# Patient Record
Sex: FEMALE | NOT HISPANIC OR LATINO | Employment: UNEMPLOYED | URBAN - METROPOLITAN AREA
[De-identification: names, ages, dates, MRNs, and addresses within clinical notes are randomized per-mention and may not be internally consistent; named-entity substitution may affect disease eponyms.]

---

## 2019-09-24 ENCOUNTER — OFFICE VISIT (OUTPATIENT)
Dept: FAMILY MEDICINE CLINIC | Facility: CLINIC | Age: 59
End: 2019-09-24

## 2019-09-24 VITALS
TEMPERATURE: 98.1 F | HEIGHT: 62 IN | SYSTOLIC BLOOD PRESSURE: 136 MMHG | DIASTOLIC BLOOD PRESSURE: 84 MMHG | HEART RATE: 75 BPM | WEIGHT: 215.38 LBS | BODY MASS INDEX: 39.63 KG/M2 | OXYGEN SATURATION: 97 %

## 2019-09-24 DIAGNOSIS — I10 ESSENTIAL HYPERTENSION: Primary | ICD-10-CM

## 2019-09-24 DIAGNOSIS — E78.5 HYPERLIPIDEMIA, UNSPECIFIED HYPERLIPIDEMIA TYPE: ICD-10-CM

## 2019-09-24 DIAGNOSIS — R07.9 CHEST PAIN, UNSPECIFIED TYPE: ICD-10-CM

## 2019-09-24 DIAGNOSIS — E11.9 TYPE 2 DIABETES MELLITUS WITHOUT COMPLICATION, WITHOUT LONG-TERM CURRENT USE OF INSULIN (HCC): ICD-10-CM

## 2019-09-24 DIAGNOSIS — Z78.0 POSTMENOPAUSE: ICD-10-CM

## 2019-09-24 DIAGNOSIS — Z00.00 HEALTH CARE MAINTENANCE: ICD-10-CM

## 2019-09-24 DIAGNOSIS — M19.90 ARTHRITIS: ICD-10-CM

## 2019-09-24 DIAGNOSIS — K21.9 GASTROESOPHAGEAL REFLUX DISEASE WITHOUT ESOPHAGITIS: ICD-10-CM

## 2019-09-24 DIAGNOSIS — R21 RASH: ICD-10-CM

## 2019-09-24 PROCEDURE — 99203 OFFICE O/P NEW LOW 30 MIN: CPT | Performed by: FAMILY MEDICINE

## 2019-09-24 RX ORDER — LANCETS 28 GAUGE
1 EACH MISCELLANEOUS
COMMUNITY
End: 2019-09-24 | Stop reason: SDUPTHER

## 2019-09-24 RX ORDER — ATORVASTATIN CALCIUM 40 MG/1
40 TABLET, FILM COATED ORAL DAILY
Qty: 90 TABLET | Refills: 3 | Status: SHIPPED | OUTPATIENT
Start: 2019-09-24 | End: 2019-12-20 | Stop reason: SDUPTHER

## 2019-09-24 RX ORDER — GLIMEPIRIDE 4 MG/1
4 TABLET ORAL DAILY
COMMUNITY
End: 2019-09-24 | Stop reason: SDUPTHER

## 2019-09-24 RX ORDER — RANITIDINE 150 MG/1
150 TABLET ORAL 2 TIMES DAILY
Qty: 180 TABLET | Refills: 3 | Status: SHIPPED | OUTPATIENT
Start: 2019-09-24 | End: 2020-03-17

## 2019-09-24 RX ORDER — CLOBETASOL PROPIONATE 0.5 MG/G
OINTMENT TOPICAL 2 TIMES DAILY
COMMUNITY
End: 2019-09-24 | Stop reason: SDUPTHER

## 2019-09-24 RX ORDER — ASPIRIN 81 MG/1
TABLET, COATED ORAL
COMMUNITY
Start: 2019-07-10 | End: 2020-09-24 | Stop reason: SDUPTHER

## 2019-09-24 RX ORDER — BUTALBITAL, ACETAMINOPHEN AND CAFFEINE 50; 325; 40 MG/1; MG/1; MG/1
1 CAPSULE ORAL
COMMUNITY
End: 2019-09-24

## 2019-09-24 RX ORDER — ATORVASTATIN CALCIUM 40 MG/1
40 TABLET, FILM COATED ORAL DAILY
COMMUNITY
End: 2019-09-24 | Stop reason: SDUPTHER

## 2019-09-24 RX ORDER — BENAZEPRIL HYDROCHLORIDE 40 MG/1
TABLET, FILM COATED ORAL
COMMUNITY
Start: 2019-07-10 | End: 2019-09-24 | Stop reason: SDUPTHER

## 2019-09-24 RX ORDER — OMEPRAZOLE 40 MG/1
CAPSULE, DELAYED RELEASE ORAL
COMMUNITY
Start: 2019-07-10 | End: 2019-09-24 | Stop reason: SDUPTHER

## 2019-09-24 RX ORDER — BENAZEPRIL HYDROCHLORIDE 40 MG/1
40 TABLET, FILM COATED ORAL DAILY
Qty: 90 TABLET | Refills: 3 | Status: SHIPPED | OUTPATIENT
Start: 2019-09-24 | End: 2020-11-02 | Stop reason: SDUPTHER

## 2019-09-24 RX ORDER — OMEPRAZOLE 40 MG/1
40 CAPSULE, DELAYED RELEASE ORAL DAILY
Qty: 90 CAPSULE | Refills: 3 | Status: SHIPPED | OUTPATIENT
Start: 2019-09-24 | End: 2020-03-17 | Stop reason: SDUPTHER

## 2019-09-24 RX ORDER — LANCETS 28 GAUGE
1 EACH MISCELLANEOUS 2 TIMES DAILY
Qty: 100 EACH | Refills: 6 | Status: SHIPPED | OUTPATIENT
Start: 2019-09-24 | End: 2020-11-02 | Stop reason: SDUPTHER

## 2019-09-24 RX ORDER — CLOBETASOL PROPIONATE 0.5 MG/G
OINTMENT TOPICAL 2 TIMES DAILY
Qty: 3 EACH | Refills: 6 | Status: SHIPPED | OUTPATIENT
Start: 2019-09-24 | End: 2019-12-20 | Stop reason: SDUPTHER

## 2019-09-24 RX ORDER — GLIMEPIRIDE 4 MG/1
4 TABLET ORAL DAILY
Qty: 90 TABLET | Refills: 3 | Status: SHIPPED | OUTPATIENT
Start: 2019-09-24 | End: 2019-12-20 | Stop reason: SDUPTHER

## 2019-09-24 RX ORDER — RANITIDINE 150 MG/1
300 TABLET ORAL 2 TIMES DAILY
COMMUNITY
End: 2019-09-24 | Stop reason: SDUPTHER

## 2019-09-24 RX ORDER — PREDNISOLONE ACETATE 10 MG/ML
1 SUSPENSION/ DROPS OPHTHALMIC 4 TIMES DAILY
COMMUNITY
End: 2021-02-02

## 2019-09-24 NOTE — PROGRESS NOTES
Subjective   Queenie Lubin is a 59 y.o. female.     Chief Complaint   Patient presents with   • Hypertension   • Diabetes   • Pain     knee & back   • Eye Problem     left eye   • Hyperlipidemia   • Heartburn     Queenie is a  new patient today.  She is 59-year-old with multiple chronic medical conditions.  But those medical conditions are new for me today.        Hypertension   This is a chronic problem. The current episode started more than 1 year ago. The problem is unchanged. The problem is controlled. Associated symptoms include chest pain. Pertinent negatives include no anxiety. There are no associated agents to hypertension. Risk factors for coronary artery disease include diabetes mellitus and dyslipidemia. Past treatments include ACE inhibitors. Current antihypertension treatment includes ACE inhibitors. The current treatment provides significant improvement. There are no compliance problems.  There is no history of angina, kidney disease or CAD/MI.   Diabetes   She presents for her follow-up diabetic visit. She has type 2 diabetes mellitus. Her disease course has been stable. Associated symptoms include chest pain. There are no hypoglycemic complications. Symptoms are stable. She is following a diabetic diet. She never participates in exercise. An ACE inhibitor/angiotensin II receptor blocker is being taken. She does not see a podiatrist.Eye exam is current.   Hyperlipidemia   This is a chronic problem. The problem is controlled. Recent lipid tests were reviewed and are normal. Associated symptoms include chest pain and myalgias. Current antihyperlipidemic treatment includes statins.   Heartburn   She complains of chest pain. This is a chronic problem. The current episode started more than 1 year ago. The problem occurs frequently. The problem has been gradually worsening. The symptoms are aggravated by certain foods. She has tried a PPI for the symptoms. The treatment provided moderate (noncompliant)  relief.        Is here today for the first time she is a new patient, 59-year-old with multiple medical problems,  Problems are chronic however new to me today.    The following portions of the patient's history were reviewed and updated as appropriate: allergies, current medications, past family history, past medical history, past social history, past surgical history and problem list.    Past Medical History:   Diagnosis Date   • Diabetes mellitus (CMS/HCC)    • GERD (gastroesophageal reflux disease)    • Hyperlipidemia    • Hypertension        History reviewed. No pertinent surgical history.    Family History   Problem Relation Age of Onset   • Heart disease Mother        Social History     Socioeconomic History   • Marital status:      Spouse name: Not on file   • Number of children: Not on file   • Years of education: Not on file   • Highest education level: Not on file   Tobacco Use   • Smoking status: Never Smoker   • Smokeless tobacco: Never Used   Substance and Sexual Activity   • Alcohol use: Defer   • Drug use: Defer   • Sexual activity: Defer       Current Outpatient Medications on File Prior to Visit   Medication Sig Dispense Refill   • ASPIRIN LOW DOSE 81 MG EC tablet      • prednisoLONE acetate (PRED FORTE) 1 % ophthalmic suspension 1 drop 4 (Four) Times a Day. Instill one drop in the left eye three times a day for one week. Instil one drop in the left eye for one week  2 times a day for one week. Instill one drop in the left eye once daily  As maintenance.     • [DISCONTINUED] atorvastatin (LIPITOR) 40 MG tablet Take 40 mg by mouth Daily.     • [DISCONTINUED] benazepril (LOTENSIN) 40 MG tablet      • [DISCONTINUED] Butalbital-APAP-Caffeine -40 MG per capsule Take 1 capsule by mouth.     • [DISCONTINUED] clobetasol (TEMOVATE) 0.05 % ointment Apply  topically to the appropriate area as directed 2 (Two) Times a Day.     • [DISCONTINUED] diclofenac (VOLTAREN) 1 % gel gel Place  on the skin  "as directed by provider 4 (Four) Times a Day.     • [DISCONTINUED] EASY TOUCH SAFETY LANCETS 28G misc Inject 1 each under the skin into the appropriate area as directed.     • [DISCONTINUED] glimepiride (AMARYL) 4 MG tablet Take 4 mg by mouth Daily.     • [DISCONTINUED] metFORMIN (GLUCOPHAGE) 1000 MG tablet Take 1,000 mg by mouth 2 (Two) Times a Day With Meals.     • [DISCONTINUED] omeprazole (priLOSEC) 40 MG capsule      • [DISCONTINUED] raNITIdine (RANITIDINE 150 MAX STRENGTH) 150 MG tablet Take 300 mg by mouth 2 (Two) Times a Day.       No current facility-administered medications on file prior to visit.        Review of Systems   Cardiovascular: Positive for chest pain.   Gastrointestinal: Positive for GERD.   Musculoskeletal: Positive for arthralgias and myalgias.   All other systems reviewed and are negative.      No results found for this or any previous visit (from the past 4704 hour(s)).  Objective   Vitals:    09/24/19 0855   BP: 136/84   Pulse: 75   Temp: 98.1 °F (36.7 °C)   SpO2: 97%   Weight: 97.7 kg (215 lb 6 oz)   Height: 157.7 cm (62.1\")     Body mass index is 39.27 kg/m².  Physical Exam   Constitutional: She is oriented to person, place, and time. She appears well-developed and well-nourished.   obese   HENT:   Head: Normocephalic and atraumatic.   Right Ear: External ear normal.   Left Ear: External ear normal.   Nose: Nose normal.   Mouth/Throat: Oropharynx is clear and moist.   Eyes: Conjunctivae and EOM are normal. Pupils are equal, round, and reactive to light.   Neck: Normal range of motion. Neck supple. No tracheal deviation present. No thyromegaly present.   Cardiovascular: Normal rate, regular rhythm and normal heart sounds. Exam reveals no gallop and no friction rub.   No murmur heard.  Pulmonary/Chest: Effort normal and breath sounds normal. No respiratory distress. She exhibits no tenderness.   Abdominal: Soft. Bowel sounds are normal. She exhibits no distension. There is no tenderness. "   Musculoskeletal: Normal range of motion. She exhibits no edema or tenderness.   Lymphadenopathy:     She has no cervical adenopathy.   Neurological: She is alert and oriented to person, place, and time. She displays normal reflexes. No cranial nerve deficit or sensory deficit. Coordination normal.   Skin: Skin is warm and dry.   Psychiatric: She has a normal mood and affect. Her behavior is normal. Judgment and thought content normal.   Nursing note and vitals reviewed.        Assessment/Plan   Queenie was seen today for hypertension, diabetes, pain, eye problem, hyperlipidemia and heartburn.    Diagnoses and all orders for this visit:    Essential hypertension  -     benazepril (LOTENSIN) 40 MG tablet; Take 1 tablet by mouth Daily.    Hyperlipidemia, unspecified hyperlipidemia type  -     atorvastatin (LIPITOR) 40 MG tablet; Take 1 tablet by mouth Daily.    Type 2 diabetes mellitus without complication, without long-term current use of insulin (CMS/[facility])  -     metFORMIN (GLUCOPHAGE) 1000 MG tablet; Take 1 tablet by mouth 2 (Two) Times a Day With Meals.  -     glimepiride (AMARYL) 4 MG tablet; Take 1 tablet by mouth Daily.  -     EASY TOUCH SAFETY LANCETS 28G misc; Inject 1 each under the skin into the appropriate area as directed 2 (Two) Times a Day.  -     Hemoglobin A1c  -     Comprehensive Metabolic Panel  -     Lipid Panel With LDL / HDL Ratio  -     Microalbumin / Creatinine Urine Ratio - Urine, Clean Catch    Gastroesophageal reflux disease without esophagitis  -     raNITIdine (RANITIDINE 150 MAX STRENGTH) 150 MG tablet; Take 1 tablet by mouth 2 (Two) Times a Day.  -     omeprazole (priLOSEC) 40 MG capsule; Take 1 capsule by mouth Daily.    Chest pain, unspecified type    Health care maintenance  -     Mammo Screening Bilateral With CAD; Future  -     Cologuard - Stool, Per Rectum    Arthritis  -     diclofenac (VOLTAREN) 1 % gel gel; Apply  topically to the appropriate area as directed 4 (Four) Times a  Day.    Rash  -     clobetasol (TEMOVATE) 0.05 % ointment; Apply  topically to the appropriate area as directed 2 (Two) Times a Day.    Postmenopause  -     DEXA Bone Density Axial      Return in about 3 months (around 12/24/2019) for DIABETES.    Pt had complete work up for CP which was negative by cardiology within last year.

## 2019-09-25 LAB
ALBUMIN SERPL-MCNC: 4.7 G/DL (ref 3.5–5.2)
ALBUMIN/CREAT UR: 30.6 MG/G CREAT (ref 0–30)
ALBUMIN/GLOB SERPL: 1.5 G/DL
ALP SERPL-CCNC: 113 U/L (ref 39–117)
ALT SERPL-CCNC: 23 U/L (ref 1–33)
AST SERPL-CCNC: 17 U/L (ref 1–32)
BILIRUB SERPL-MCNC: 0.4 MG/DL (ref 0.2–1.2)
BUN SERPL-MCNC: 11 MG/DL (ref 6–20)
BUN/CREAT SERPL: 17.7 (ref 7–25)
CALCIUM SERPL-MCNC: 11 MG/DL (ref 8.6–10.5)
CHLORIDE SERPL-SCNC: 95 MMOL/L (ref 98–107)
CHOLEST SERPL-MCNC: 250 MG/DL (ref 0–200)
CO2 SERPL-SCNC: 29.3 MMOL/L (ref 22–29)
CREAT SERPL-MCNC: 0.62 MG/DL (ref 0.57–1)
CREAT UR-MCNC: 61.2 MG/DL
GLOBULIN SER CALC-MCNC: 3.1 GM/DL
GLUCOSE SERPL-MCNC: 366 MG/DL (ref 65–99)
HBA1C MFR BLD: 11.9 % (ref 4.8–5.6)
HDLC SERPL-MCNC: 42 MG/DL (ref 40–60)
LDLC SERPL CALC-MCNC: 160 MG/DL (ref 0–100)
LDLC/HDLC SERPL: 3.8 {RATIO}
MICROALBUMIN UR-MCNC: 18.7 UG/ML
POTASSIUM SERPL-SCNC: 4.5 MMOL/L (ref 3.5–5.2)
PROT SERPL-MCNC: 7.8 G/DL (ref 6–8.5)
SODIUM SERPL-SCNC: 136 MMOL/L (ref 136–145)
TRIGL SERPL-MCNC: 242 MG/DL (ref 0–150)
VLDLC SERPL CALC-MCNC: 48.4 MG/DL

## 2019-10-26 ENCOUNTER — RESULTS ENCOUNTER (OUTPATIENT)
Dept: FAMILY MEDICINE CLINIC | Facility: CLINIC | Age: 59
End: 2019-10-26

## 2019-10-26 DIAGNOSIS — Z00.00 HEALTH CARE MAINTENANCE: ICD-10-CM

## 2019-10-31 ENCOUNTER — APPOINTMENT (OUTPATIENT)
Dept: BONE DENSITY | Facility: HOSPITAL | Age: 59
End: 2019-10-31

## 2019-11-27 ENCOUNTER — TELEPHONE (OUTPATIENT)
Dept: FAMILY MEDICINE CLINIC | Facility: CLINIC | Age: 59
End: 2019-11-27

## 2019-11-27 NOTE — TELEPHONE ENCOUNTER
[facility] laboratory called to confirm pt contact info. They stated that the patient in the cologaurd testing however they did not put the date and time on collection. Please note [facility] will only be able to keep the specimen 60day from date received (11/7/19). They will continue to tray an reach patient at the following number [phone].

## 2019-12-20 ENCOUNTER — OFFICE VISIT (OUTPATIENT)
Dept: FAMILY MEDICINE CLINIC | Facility: CLINIC | Age: 59
End: 2019-12-20

## 2019-12-20 VITALS
OXYGEN SATURATION: 98 % | DIASTOLIC BLOOD PRESSURE: 78 MMHG | WEIGHT: 220.38 LBS | HEIGHT: 62 IN | TEMPERATURE: 97.7 F | BODY MASS INDEX: 40.55 KG/M2 | HEART RATE: 80 BPM | SYSTOLIC BLOOD PRESSURE: 128 MMHG

## 2019-12-20 DIAGNOSIS — L40.9 PSORIASIS: ICD-10-CM

## 2019-12-20 DIAGNOSIS — E83.52 HYPERCALCEMIA: ICD-10-CM

## 2019-12-20 DIAGNOSIS — E11.9 TYPE 2 DIABETES MELLITUS WITHOUT COMPLICATION, WITHOUT LONG-TERM CURRENT USE OF INSULIN (HCC): Primary | ICD-10-CM

## 2019-12-20 DIAGNOSIS — R21 RASH: ICD-10-CM

## 2019-12-20 DIAGNOSIS — E78.5 HYPERLIPIDEMIA, UNSPECIFIED HYPERLIPIDEMIA TYPE: ICD-10-CM

## 2019-12-20 DIAGNOSIS — E66.01 MORBIDLY OBESE (HCC): ICD-10-CM

## 2019-12-20 PROCEDURE — 99214 OFFICE O/P EST MOD 30 MIN: CPT | Performed by: FAMILY MEDICINE

## 2019-12-20 RX ORDER — CLOBETASOL PROPIONATE 0.5 MG/G
OINTMENT TOPICAL 2 TIMES DAILY
Qty: 240 G | Refills: 6 | Status: SHIPPED | OUTPATIENT
Start: 2019-12-20 | End: 2020-11-02

## 2019-12-20 RX ORDER — ACYCLOVIR 400 MG/1
TABLET ORAL
COMMUNITY
Start: 2019-09-24

## 2019-12-20 RX ORDER — BLOOD-GLUCOSE METER
1 EACH MISCELLANEOUS DAILY
Refills: 0 | COMMUNITY
Start: 2019-09-26 | End: 2020-09-24 | Stop reason: SDUPTHER

## 2019-12-20 RX ORDER — ATORVASTATIN CALCIUM 40 MG/1
80 TABLET, FILM COATED ORAL DAILY
Qty: 90 TABLET | Refills: 3 | Status: SHIPPED | OUTPATIENT
Start: 2019-12-20 | End: 2020-11-02 | Stop reason: SDUPTHER

## 2019-12-20 RX ORDER — GLIMEPIRIDE 4 MG/1
4 TABLET ORAL DAILY
Qty: 90 TABLET | Refills: 3 | Status: SHIPPED | OUTPATIENT
Start: 2019-12-20 | End: 2020-02-24 | Stop reason: SDUPTHER

## 2019-12-20 RX ORDER — BLOOD SUGAR DIAGNOSTIC
1 STRIP MISCELLANEOUS DAILY
Refills: 0 | COMMUNITY
Start: 2019-09-26 | End: 2020-05-08 | Stop reason: SDUPTHER

## 2019-12-20 NOTE — PROGRESS NOTES
Subjective   Queenie Lubin is a 59 y.o. female.     Chief Complaint   Patient presents with   • Diabetes   • Hyperlipidemia   • Hypertension   • Psoriasis       Diabetes   She presents for her follow-up diabetic visit. She has type 2 diabetes mellitus. No MedicAlert identification noted. Her disease course has been worsening. Pertinent negatives for hypoglycemia include no dizziness. There are no diabetic associated symptoms. Pertinent negatives for diabetes include no blurred vision, no chest pain, no fatigue, no polydipsia and no polyuria. Symptoms are worsening. Current diabetic treatment includes oral agent (dual therapy). She is compliant with treatment most of the time. Her weight is increasing steadily.   Hyperlipidemia   This is a chronic problem. The problem is uncontrolled. Pertinent negatives include no chest pain or shortness of breath.   Hypertension   This is a chronic problem. The current episode started more than 1 year ago. The problem has been waxing and waning since onset. The problem is controlled. Pertinent negatives include no blurred vision, chest pain, palpitations or shortness of breath.   Psoriasis   This is a chronic (UNCONTROLLED, SEEING DERM.) problem.          The following portions of the patient's history were reviewed and updated as appropriate: allergies, current medications, past family history, past medical history, past social history, past surgical history and problem list.    Past Medical History:   Diagnosis Date   • Diabetes mellitus (CMS/[facility])    • GERD (gastroesophageal reflux disease)    • Hyperlipidemia    • Hypertension        History reviewed. No pertinent surgical history.    Family History   Problem Relation Age of Onset   • Heart disease Mother        Social History     Socioeconomic History   • Marital status:      Spouse name: Not on file   • Number of children: Not on file   • Years of education: Not on file   • Highest education level: Not on file    Tobacco Use   • Smoking status: Never Smoker   • Smokeless tobacco: Never Used   Substance and Sexual Activity   • Alcohol use: Defer   • Drug use: Defer   • Sexual activity: Defer       Current Outpatient Medications on File Prior to Visit   Medication Sig Dispense Refill   • ACCU-CHEK MATEO PLUS test strip 1 each by Other route Daily.  0   • acyclovir (ZOVIRAX) 400 MG tablet      • ASPIRIN LOW DOSE 81 MG EC tablet      • benazepril (LOTENSIN) 40 MG tablet Take 1 tablet by mouth Daily. 90 tablet 3   • diclofenac (VOLTAREN) 1 % gel gel Apply  topically to the appropriate area as directed 4 (Four) Times a Day. 3 tube 6   • EASY TOUCH SAFETY LANCETS 28G misc Inject 1 each under the skin into the appropriate area as directed 2 (Two) Times a Day. 100 each 6   • metFORMIN (GLUCOPHAGE) 1000 MG tablet Take 1 tablet by mouth 2 (Two) Times a Day With Meals. 180 tablet 3   • omeprazole (priLOSEC) 40 MG capsule Take 1 capsule by mouth Daily. 90 capsule 3   • prednisoLONE acetate (PRED FORTE) 1 % ophthalmic suspension 1 drop 4 (Four) Times a Day. Instill one drop in the left eye three times a day for one week. Instil one drop in the left eye for one week  2 times a day for one week. Instill one drop in the left eye once daily  As maintenance.     • raNITIdine (RANITIDINE 150 MAX STRENGTH) 150 MG tablet Take 1 tablet by mouth 2 (Two) Times a Day. 180 tablet 3   • [DISCONTINUED] atorvastatin (LIPITOR) 40 MG tablet Take 1 tablet by mouth Daily. 90 tablet 3   • [DISCONTINUED] clobetasol (TEMOVATE) 0.05 % ointment Apply  topically to the appropriate area as directed 2 (Two) Times a Day. 3 each 6   • [DISCONTINUED] glimepiride (AMARYL) 4 MG tablet Take 1 tablet by mouth Daily. 90 tablet 3   • Blood Glucose Monitoring Suppl (ACCU-CHEK MATEO PLUS) w/Device kit 1 each by Other route Daily.  0     No current facility-administered medications on file prior to visit.        Review of Systems   Constitutional: Negative for fatigue and  unexpected weight gain.   Eyes: Negative for blurred vision.   Respiratory: Negative for cough, chest tightness and shortness of breath.    Cardiovascular: Negative for chest pain, palpitations and leg swelling.   Gastrointestinal: Negative for nausea and vomiting.   Endocrine: Negative for polydipsia and polyuria.   Genitourinary: Negative for frequency.   Skin: Negative for skin lesions.   Neurological: Negative for dizziness, light-headedness, numbness and headache.       Recent Results (from the past 4704 hour(s))   Hemoglobin A1c    Collection Time: 09/24/19  9:49 AM   Result Value Ref Range    Hemoglobin A1C 11.90 (H) 4.80 - 5.60 %   Comprehensive Metabolic Panel    Collection Time: 09/24/19  9:49 AM   Result Value Ref Range    Glucose 366 (H) 65 - 99 mg/dL    BUN 11 6 - 20 mg/dL    Creatinine 0.62 0.57 - 1.00 mg/dL    eGFR Non African Am 99 >60 mL/min/1.73    eGFR African Am 119 >60 mL/min/1.73    BUN/Creatinine Ratio 17.7 7.0 - 25.0    Sodium 136 136 - 145 mmol/L    Potassium 4.5 3.5 - 5.2 mmol/L    Chloride 95 (L) 98 - 107 mmol/L    Total CO2 29.3 (H) 22.0 - 29.0 mmol/L    Calcium 11.0 (H) 8.6 - 10.5 mg/dL    Total Protein 7.8 6.0 - 8.5 g/dL    Albumin 4.70 3.50 - 5.20 g/dL    Globulin 3.1 gm/dL    A/G Ratio 1.5 g/dL    Total Bilirubin 0.4 0.2 - 1.2 mg/dL    Alkaline Phosphatase 113 39 - 117 U/L    AST (SGOT) 17 1 - 32 U/L    ALT (SGPT) 23 1 - 33 U/L   Lipid Panel With LDL / HDL Ratio    Collection Time: 09/24/19  9:49 AM   Result Value Ref Range    Total Cholesterol 250 (H) 0 - 200 mg/dL    Triglycerides 242 (H) 0 - 150 mg/dL    HDL Cholesterol 42 40 - 60 mg/dL    VLDL Cholesterol 48.4 mg/dL    LDL Cholesterol  160 (H) 0 - 100 mg/dL    LDL/HDL Ratio 3.80    Microalbumin / Creatinine Urine Ratio - Urine, Clean Catch    Collection Time: 09/24/19  9:49 AM   Result Value Ref Range    Creatinine, Urine 61.2 Not Estab. mg/dL    Microalbumin, Urine 18.7 Not Estab. ug/mL    Microalbumin/Creatinine Ratio 30.6 (H)  "0.0 - 30.0 mg/g creat     Objective   Vitals:    12/20/19 0901   BP: 128/78   Pulse: 80   Temp: 97.7 °F (36.5 °C)   SpO2: 98%   Weight: 100 kg (220 lb 6 oz)   Height: 157.7 cm (62.09\")     Body mass index is 40.19 kg/m².  Physical Exam   Constitutional: She appears well-developed and well-nourished. No distress.   Cardiovascular: Normal rate and regular rhythm.   Pulmonary/Chest: Effort normal and breath sounds normal. No respiratory distress. She has no wheezes.   Skin: She is not diaphoretic.   Nursing note and vitals reviewed.        Assessment/Plan   Queenie was seen today for diabetes, hyperlipidemia, hypertension and psoriasis.    Diagnoses and all orders for this visit:    Type 2 diabetes mellitus without complication, without long-term current use of insulin (CMS/[facility])  -     glimepiride (AMARYL) 4 MG tablet; Take 1 tablet by mouth Daily.  -     Hemoglobin A1c  -     Comprehensive Metabolic Panel  -     Lipid Panel With LDL / HDL Ratio  -     Dulaglutide 0.75 MG/0.5ML solution pen-injector; Inject 0.75 mg under the skin into the appropriate area as directed 1 (One) Time Per Week.    Rash  -     clobetasol (TEMOVATE) 0.05 % ointment; Apply  topically to the appropriate area as directed 2 (Two) Times a Day.  -     Ambulatory Referral to Dermatology    Psoriasis  -     clobetasol (TEMOVATE) 0.05 % ointment; Apply  topically to the appropriate area as directed 2 (Two) Times a Day.  -     Ambulatory Referral to Dermatology    Hyperlipidemia, unspecified hyperlipidemia type  -     atorvastatin (LIPITOR) 40 MG tablet; Take 2 tablets by mouth Daily.  -     Comprehensive Metabolic Panel  -     Lipid Panel With LDL / HDL Ratio    Hypercalcemia  -     PTH, Intact & Calcium  -     Calcium, Ionized    Morbidly obese (CMS/HCC)    DIET DISCUSSED IN DETAIL.           "

## 2019-12-22 LAB
ALBUMIN SERPL-MCNC: 4.6 G/DL (ref 3.5–5.2)
ALBUMIN/GLOB SERPL: 1.5 G/DL
ALP SERPL-CCNC: 99 U/L (ref 39–117)
ALT SERPL-CCNC: 20 U/L (ref 1–33)
AST SERPL-CCNC: 15 U/L (ref 1–32)
BILIRUB SERPL-MCNC: 0.4 MG/DL (ref 0.2–1.2)
BUN SERPL-MCNC: 11 MG/DL (ref 6–20)
BUN/CREAT SERPL: 16.9 (ref 7–25)
CA-I SERPL ISE-MCNC: 5.7 MG/DL (ref 4.5–5.6)
CALCIUM SERPL-MCNC: 10.4 MG/DL (ref 8.6–10.5)
CHLORIDE SERPL-SCNC: 97 MMOL/L (ref 98–107)
CHOLEST SERPL-MCNC: 231 MG/DL (ref 0–200)
CO2 SERPL-SCNC: 26.7 MMOL/L (ref 22–29)
CREAT SERPL-MCNC: 0.65 MG/DL (ref 0.57–1)
GLOBULIN SER CALC-MCNC: 3 GM/DL
GLUCOSE SERPL-MCNC: 340 MG/DL (ref 65–99)
HBA1C MFR BLD: 11.5 % (ref 4.8–5.6)
HDLC SERPL-MCNC: 44 MG/DL (ref 40–60)
INTACT PTH: NORMAL
LDLC SERPL CALC-MCNC: 146 MG/DL (ref 0–100)
LDLC/HDLC SERPL: 3.32 {RATIO}
POTASSIUM SERPL-SCNC: 4.5 MMOL/L (ref 3.5–5.2)
PROT SERPL-MCNC: 7.6 G/DL (ref 6–8.5)
PTH-INTACT SERPL-MCNC: 41 PG/ML (ref 15–65)
SODIUM SERPL-SCNC: 137 MMOL/L (ref 136–145)
TRIGL SERPL-MCNC: 204 MG/DL (ref 0–150)
VLDLC SERPL CALC-MCNC: 40.8 MG/DL

## 2020-02-24 ENCOUNTER — TELEPHONE (OUTPATIENT)
Dept: FAMILY MEDICINE CLINIC | Facility: CLINIC | Age: 60
End: 2020-02-24

## 2020-02-24 DIAGNOSIS — E11.9 TYPE 2 DIABETES MELLITUS WITHOUT COMPLICATION, WITHOUT LONG-TERM CURRENT USE OF INSULIN (HCC): ICD-10-CM

## 2020-02-24 RX ORDER — GLIMEPIRIDE 4 MG/1
4 TABLET ORAL DAILY
Qty: 90 TABLET | Refills: 3 | Status: SHIPPED | OUTPATIENT
Start: 2020-02-24 | End: 2020-09-24 | Stop reason: SDUPTHER

## 2020-03-10 ENCOUNTER — TELEPHONE (OUTPATIENT)
Dept: FAMILY MEDICINE CLINIC | Facility: CLINIC | Age: 60
End: 2020-03-10

## 2020-03-17 DIAGNOSIS — K21.9 GASTROESOPHAGEAL REFLUX DISEASE WITHOUT ESOPHAGITIS: ICD-10-CM

## 2020-03-17 RX ORDER — OMEPRAZOLE 40 MG/1
40 CAPSULE, DELAYED RELEASE ORAL DAILY
Qty: 90 CAPSULE | Refills: 3 | Status: SHIPPED | OUTPATIENT
Start: 2020-03-17 | End: 2020-11-02 | Stop reason: SDUPTHER

## 2020-05-08 RX ORDER — BLOOD SUGAR DIAGNOSTIC
1 STRIP MISCELLANEOUS DAILY
Qty: 100 EACH | Refills: 2 | Status: SHIPPED | OUTPATIENT
Start: 2020-05-08 | End: 2020-09-24 | Stop reason: SDUPTHER

## 2020-08-03 DIAGNOSIS — Z12.39 SCREENING BREAST EXAMINATION: Primary | ICD-10-CM

## 2020-09-24 ENCOUNTER — TELEMEDICINE (OUTPATIENT)
Dept: FAMILY MEDICINE CLINIC | Facility: CLINIC | Age: 60
End: 2020-09-24

## 2020-09-24 DIAGNOSIS — I10 ESSENTIAL HYPERTENSION: ICD-10-CM

## 2020-09-24 DIAGNOSIS — E78.2 MIXED HYPERLIPIDEMIA: ICD-10-CM

## 2020-09-24 DIAGNOSIS — E11.9 TYPE 2 DIABETES MELLITUS WITHOUT COMPLICATION, WITHOUT LONG-TERM CURRENT USE OF INSULIN (HCC): Primary | ICD-10-CM

## 2020-09-24 DIAGNOSIS — E66.01 MORBIDLY OBESE (HCC): ICD-10-CM

## 2020-09-24 DIAGNOSIS — K21.9 GASTROESOPHAGEAL REFLUX DISEASE WITHOUT ESOPHAGITIS: ICD-10-CM

## 2020-09-24 PROCEDURE — 99214 OFFICE O/P EST MOD 30 MIN: CPT | Performed by: FAMILY MEDICINE

## 2020-09-24 RX ORDER — BLOOD-GLUCOSE METER
1 EACH MISCELLANEOUS DAILY
Qty: 1 KIT | Refills: 0 | Status: SHIPPED | OUTPATIENT
Start: 2020-09-24 | End: 2020-11-02 | Stop reason: SDUPTHER

## 2020-09-24 RX ORDER — BLOOD-GLUCOSE METER
1 KIT MISCELLANEOUS DAILY
Qty: 1 EACH | Refills: 0 | Status: SHIPPED | OUTPATIENT
Start: 2020-09-24 | End: 2022-01-28 | Stop reason: SDUPTHER

## 2020-09-24 RX ORDER — GLIMEPIRIDE 4 MG/1
4 TABLET ORAL DAILY
Qty: 90 TABLET | Refills: 3 | Status: SHIPPED | OUTPATIENT
Start: 2020-09-24 | End: 2020-11-02 | Stop reason: SDUPTHER

## 2020-09-24 RX ORDER — ASPIRIN 81 MG/1
81 TABLET, COATED ORAL DAILY
Qty: 90 TABLET | Refills: 3 | Status: SHIPPED | OUTPATIENT
Start: 2020-09-24 | End: 2020-11-02 | Stop reason: SDUPTHER

## 2020-09-24 RX ORDER — BLOOD SUGAR DIAGNOSTIC
1 STRIP MISCELLANEOUS DAILY
Qty: 100 EACH | Refills: 2 | Status: SHIPPED | OUTPATIENT
Start: 2020-09-24 | End: 2020-11-02 | Stop reason: SDUPTHER

## 2020-09-24 NOTE — PROGRESS NOTES
Cristino Lubin is a 60 y.o. female.     Consent given    Time 20 min    Visit  [facility]    CC: follow up on DM    Diabetes  She presents for her follow-up diabetic visit. She has type 2 diabetes mellitus. Her disease course has been stable. There are no hypoglycemic associated symptoms. Pertinent negatives for hypoglycemia include no dizziness. There are no diabetic associated symptoms. Pertinent negatives for diabetes include no blurred vision, no chest pain and no fatigue. Symptoms are stable.   Hypertension  This is a chronic problem. The current episode started more than 1 year ago. The problem is unchanged. The problem is controlled. Pertinent negatives include no blurred vision, chest pain, palpitations or shortness of breath.   Hyperlipidemia  This is a chronic problem. The current episode started more than 1 year ago. The problem is controlled. Recent lipid tests were reviewed and are normal. Pertinent negatives include no chest pain or shortness of breath.   Heartburn  She reports no chest pain or no coughing. stable. Pertinent negatives include no fatigue.          The following portions of the patient's history were reviewed and updated as appropriate: allergies, current medications, past family history, past medical history, past social history, past surgical history and problem list.    Past Medical History:   Diagnosis Date   • Diabetes mellitus (CMS/[facility])    • GERD (gastroesophageal reflux disease)    • Hyperlipidemia    • Hypertension        History reviewed. No pertinent surgical history.    Family History   Problem Relation Age of Onset   • Heart disease Mother        Social History     Socioeconomic History   • Marital status:      Spouse name: Not on file   • Number of children: Not on file   • Years of education: Not on file   • Highest education level: Not on file   Tobacco Use   • Smoking status: Never Smoker   • Smokeless tobacco: Never Used   Substance and Sexual  Activity   • Alcohol use: Defer   • Drug use: Defer   • Sexual activity: Defer       Current Outpatient Medications on File Prior to Visit   Medication Sig Dispense Refill   • acyclovir (ZOVIRAX) 400 MG tablet      • atorvastatin (LIPITOR) 40 MG tablet Take 2 tablets by mouth Daily. 90 tablet 3   • benazepril (LOTENSIN) 40 MG tablet Take 1 tablet by mouth Daily. 90 tablet 3   • clobetasol (TEMOVATE) 0.05 % ointment Apply  topically to the appropriate area as directed 2 (Two) Times a Day. 240 g 6   • diclofenac (VOLTAREN) 1 % gel gel Apply  topically to the appropriate area as directed 4 (Four) Times a Day. 3 tube 6   • Dulaglutide 0.75 MG/0.5ML solution pen-injector Inject 0.75 mg under the skin into the appropriate area as directed 1 (One) Time Per Week. 4 pen 11   • EASY TOUCH SAFETY LANCETS 28G misc Inject 1 each under the skin into the appropriate area as directed 2 (Two) Times a Day. 100 each 6   • metFORMIN (GLUCOPHAGE) 1000 MG tablet Take 1 tablet by mouth 2 (Two) Times a Day With Meals. 180 tablet 3   • omeprazole (priLOSEC) 40 MG capsule Take 1 capsule by mouth Daily. 90 capsule 3   • prednisoLONE acetate (PRED FORTE) 1 % ophthalmic suspension 1 drop 4 (Four) Times a Day. Instill one drop in the left eye three times a day for one week. Instil one drop in the left eye for one week  2 times a day for one week. Instill one drop in the left eye once daily  As maintenance.     • [DISCONTINUED] ACCU-CHEK MATEO PLUS test strip 1 each by Other route Daily. 100 each 2   • [DISCONTINUED] ASPIRIN LOW DOSE 81 MG EC tablet      • [DISCONTINUED] Blood Glucose Monitoring Suppl (ACCU-CHEK MATEO PLUS) w/Device kit 1 each by Other route Daily.  0   • [DISCONTINUED] glimepiride (AMARYL) 4 MG tablet Take 1 tablet by mouth Daily. 90 tablet 3     No current facility-administered medications on file prior to visit.        Review of Systems   Constitutional: Negative for fatigue.   Eyes: Negative for blurred vision.   Respiratory:  Negative for cough, chest tightness and shortness of breath.    Cardiovascular: Negative for chest pain, palpitations and leg swelling.   Neurological: Negative for dizziness, light-headedness and headache.       No results found for this or any previous visit (from the past 4704 hour(s)).  Objective   There were no vitals filed for this visit.  There is no height or weight on file to calculate BMI.  Physical Exam  Constitutional:       Appearance: Normal appearance. She is obese.   Neurological:      Mental Status: She is alert.           Diagnoses and all orders for this visit:    Type 2 diabetes mellitus without complication, without long-term current use of insulin (CMS/[facility])  -     glimepiride (AMARYL) 4 MG tablet; Take 1 tablet by mouth Daily.  -     Aspirin Low Dose 81 MG EC tablet; Take 1 tablet by mouth Daily.  -     Accu-Chek Ruby Plus test strip; 1 each by Other route Daily.  -     Blood Glucose Monitoring Suppl (Accu-Chek Ruby Plus) w/Device kit; 1 each by Other route Daily.  -     glucose monitor monitoring kit; 1 each Daily.    Essential hypertension    Mixed hyperlipidemia    Gastroesophageal reflux disease without esophagitis    Morbidly obese (CMS/[facility])      Return in about 2 months (around 11/24/2020) for DIABETES, HYPERTENSION.  Continue all current meds

## 2020-11-02 ENCOUNTER — OFFICE VISIT (OUTPATIENT)
Dept: FAMILY MEDICINE CLINIC | Facility: CLINIC | Age: 60
End: 2020-11-02

## 2020-11-02 ENCOUNTER — HOSPITAL ENCOUNTER (OUTPATIENT)
Dept: MAMMOGRAPHY | Facility: HOSPITAL | Age: 60
Discharge: HOME | End: 2020-11-02
Admitting: FAMILY MEDICINE

## 2020-11-02 VITALS
BODY MASS INDEX: 39.01 KG/M2 | DIASTOLIC BLOOD PRESSURE: 74 MMHG | OXYGEN SATURATION: 95 % | SYSTOLIC BLOOD PRESSURE: 134 MMHG | TEMPERATURE: 98.3 F | HEART RATE: 77 BPM | WEIGHT: 212 LBS | HEIGHT: 62 IN

## 2020-11-02 DIAGNOSIS — K21.9 GASTROESOPHAGEAL REFLUX DISEASE WITHOUT ESOPHAGITIS: ICD-10-CM

## 2020-11-02 DIAGNOSIS — Z23 IMMUNIZATION DUE: ICD-10-CM

## 2020-11-02 DIAGNOSIS — E78.5 HYPERLIPIDEMIA, UNSPECIFIED HYPERLIPIDEMIA TYPE: ICD-10-CM

## 2020-11-02 DIAGNOSIS — I10 ESSENTIAL HYPERTENSION: ICD-10-CM

## 2020-11-02 DIAGNOSIS — Z12.39 SCREENING BREAST EXAMINATION: ICD-10-CM

## 2020-11-02 DIAGNOSIS — E11.9 TYPE 2 DIABETES MELLITUS WITHOUT COMPLICATION, WITHOUT LONG-TERM CURRENT USE OF INSULIN (HCC): Primary | ICD-10-CM

## 2020-11-02 DIAGNOSIS — R21 RASH: ICD-10-CM

## 2020-11-02 PROCEDURE — 99214 OFFICE O/P EST MOD 30 MIN: CPT | Performed by: FAMILY MEDICINE

## 2020-11-02 PROCEDURE — 90471 IMMUNIZATION ADMIN: CPT | Performed by: FAMILY MEDICINE

## 2020-11-02 PROCEDURE — 90732 PPSV23 VACC 2 YRS+ SUBQ/IM: CPT | Performed by: FAMILY MEDICINE

## 2020-11-02 PROCEDURE — 77063 BREAST TOMOSYNTHESIS BI: CPT

## 2020-11-02 PROCEDURE — 77067 SCR MAMMO BI INCL CAD: CPT

## 2020-11-02 RX ORDER — OMEPRAZOLE 40 MG/1
40 CAPSULE, DELAYED RELEASE ORAL DAILY
Qty: 90 CAPSULE | Refills: 3 | Status: SHIPPED | OUTPATIENT
Start: 2020-11-02 | End: 2021-06-24

## 2020-11-02 RX ORDER — ASPIRIN 81 MG/1
81 TABLET, COATED ORAL DAILY
Qty: 90 TABLET | Refills: 3 | Status: SHIPPED | OUTPATIENT
Start: 2020-11-02 | End: 2021-09-09

## 2020-11-02 RX ORDER — DULAGLUTIDE 1.5 MG/.5ML
1.5 INJECTION, SOLUTION SUBCUTANEOUS WEEKLY
Qty: 4 PEN | Refills: 11 | Status: SHIPPED | OUTPATIENT
Start: 2020-11-02 | End: 2021-02-02

## 2020-11-02 RX ORDER — GLIMEPIRIDE 4 MG/1
4 TABLET ORAL DAILY
Qty: 90 TABLET | Refills: 3 | Status: SHIPPED | OUTPATIENT
Start: 2020-11-02 | End: 2021-12-06

## 2020-11-02 RX ORDER — CLOBETASOL PROPIONATE 0.5 MG/G
OINTMENT TOPICAL 2 TIMES DAILY
Qty: 60 G | Refills: 11 | Status: SHIPPED | OUTPATIENT
Start: 2020-11-02 | End: 2020-12-24

## 2020-11-02 RX ORDER — BLOOD-GLUCOSE METER
1 EACH MISCELLANEOUS DAILY
Qty: 1 KIT | Refills: 0 | Status: SHIPPED | OUTPATIENT
Start: 2020-11-02 | End: 2022-01-28 | Stop reason: SDUPTHER

## 2020-11-02 RX ORDER — BENAZEPRIL HYDROCHLORIDE 40 MG/1
40 TABLET, FILM COATED ORAL DAILY
Qty: 90 TABLET | Refills: 3 | Status: SHIPPED | OUTPATIENT
Start: 2020-11-02 | End: 2021-11-22 | Stop reason: SDUPTHER

## 2020-11-02 RX ORDER — LANCETS 28 GAUGE
1 EACH MISCELLANEOUS 2 TIMES DAILY
Qty: 100 EACH | Refills: 6 | Status: SHIPPED | OUTPATIENT
Start: 2020-11-02 | End: 2022-02-01 | Stop reason: SDUPTHER

## 2020-11-02 RX ORDER — ATORVASTATIN CALCIUM 40 MG/1
80 TABLET, FILM COATED ORAL DAILY
Qty: 90 TABLET | Refills: 3 | Status: SHIPPED | OUTPATIENT
Start: 2020-11-02 | End: 2021-11-22 | Stop reason: SDUPTHER

## 2020-11-02 RX ORDER — BLOOD SUGAR DIAGNOSTIC
1 STRIP MISCELLANEOUS DAILY
Qty: 100 EACH | Refills: 2 | Status: SHIPPED | OUTPATIENT
Start: 2020-11-02 | End: 2021-02-02 | Stop reason: SDUPTHER

## 2020-11-02 NOTE — PROGRESS NOTES
Subjective   Queenie Lubin is a 60 y.o. female.     Chief Complaint   Patient presents with   • Diabetes   • Hypertension   • Follow-up       Diabetes  She presents for her follow-up diabetic visit. She has type 2 diabetes mellitus. Her disease course has been improving. Pertinent negatives for hypoglycemia include no dizziness. Pertinent negatives for diabetes include no blurred vision, no chest pain and no fatigue. Symptoms are improving.   Hypertension  This is a chronic problem. The current episode started more than 1 year ago. The problem is unchanged. The problem is controlled. Pertinent negatives include no blurred vision, chest pain, palpitations or shortness of breath.   Hyperlipidemia  This is a chronic problem. The current episode started more than 1 year ago. The problem is controlled. Recent lipid tests were reviewed and are normal. Pertinent negatives include no chest pain or shortness of breath.   Heartburn  She reports no chest pain or no coughing. stable on meds. Pertinent negatives include no fatigue.          The following portions of the patient's history were reviewed and updated as appropriate: allergies, current medications, past family history, past medical history, past social history, past surgical history and problem list.    Past Medical History:   Diagnosis Date   • Diabetes mellitus (CMS/[facility])    • GERD (gastroesophageal reflux disease)    • Hyperlipidemia    • Hypertension        No past surgical history on file.    Family History   Problem Relation Age of Onset   • Heart disease Mother        Social History     Socioeconomic History   • Marital status:      Spouse name: Not on file   • Number of children: Not on file   • Years of education: Not on file   • Highest education level: Not on file   Tobacco Use   • Smoking status: Never Smoker   • Smokeless tobacco: Never Used   Substance and Sexual Activity   • Alcohol use: Defer   • Drug use: Defer   • Sexual activity: Defer        Current Outpatient Medications on File Prior to Visit   Medication Sig Dispense Refill   • acyclovir (ZOVIRAX) 400 MG tablet      • diclofenac (VOLTAREN) 1 % gel gel Apply  topically to the appropriate area as directed 4 (Four) Times a Day. 3 tube 6   • glucose monitor monitoring kit 1 each Daily. 1 each 0   • prednisoLONE acetate (PRED FORTE) 1 % ophthalmic suspension 1 drop 4 (Four) Times a Day. Instill one drop in the left eye three times a day for one week. Instil one drop in the left eye for one week  2 times a day for one week. Instill one drop in the left eye once daily  As maintenance.     • [DISCONTINUED] Accu-Chek Ruby Plus test strip 1 each by Other route Daily. 100 each 2   • [DISCONTINUED] Aspirin Low Dose 81 MG EC tablet Take 1 tablet by mouth Daily. 90 tablet 3   • [DISCONTINUED] atorvastatin (LIPITOR) 40 MG tablet Take 2 tablets by mouth Daily. 90 tablet 3   • [DISCONTINUED] benazepril (LOTENSIN) 40 MG tablet Take 1 tablet by mouth Daily. 90 tablet 3   • [DISCONTINUED] Blood Glucose Monitoring Suppl (Accu-Chek Ruby Plus) w/Device kit 1 each by Other route Daily. 1 kit 0   • [DISCONTINUED] clobetasol (TEMOVATE) 0.05 % ointment Apply  topically to the appropriate area as directed 2 (Two) Times a Day. 240 g 6   • [DISCONTINUED] Dulaglutide 0.75 MG/0.5ML solution pen-injector Inject 0.75 mg under the skin into the appropriate area as directed 1 (One) Time Per Week. 4 pen 11   • [DISCONTINUED] EASY TOUCH SAFETY LANCETS 28G misc Inject 1 each under the skin into the appropriate area as directed 2 (Two) Times a Day. 100 each 6   • [DISCONTINUED] glimepiride (AMARYL) 4 MG tablet Take 1 tablet by mouth Daily. 90 tablet 3   • [DISCONTINUED] metFORMIN (GLUCOPHAGE) 1000 MG tablet Take 1 tablet by mouth 2 (Two) Times a Day With Meals. 180 tablet 3   • [DISCONTINUED] omeprazole (priLOSEC) 40 MG capsule Take 1 capsule by mouth Daily. 90 capsule 3     No current facility-administered medications on file  "prior to visit.        Review of Systems   Constitutional: Negative for fatigue.   Eyes: Negative for blurred vision.   Respiratory: Negative for cough, chest tightness and shortness of breath.    Cardiovascular: Negative for chest pain, palpitations and leg swelling.   Neurological: Negative for dizziness, light-headedness and headache.       No results found for this or any previous visit (from the past 4704 hour(s)).  Objective   Vitals:    11/02/20 1009   BP: 134/74   Pulse: 77   Temp: 98.3 °F (36.8 °C)   SpO2: 95%   Weight: 96.2 kg (212 lb)   Height: 157.7 cm (62.09\")     Body mass index is 38.67 kg/m².  Physical Exam  Vitals signs and nursing note reviewed.   Constitutional:       General: She is not in acute distress.     Appearance: She is well-developed. She is not diaphoretic.   Cardiovascular:      Rate and Rhythm: Normal rate and regular rhythm.   Pulmonary:      Effort: Pulmonary effort is normal. No respiratory distress.      Breath sounds: Normal breath sounds. No wheezing.           Diagnoses and all orders for this visit:    1. Type 2 diabetes mellitus without complication, without long-term current use of insulin (CMS/[facility]) (Primary)  -     Blood Glucose Monitoring Suppl (Accu-Chek Ruby Plus) w/Device kit; 1 each by Other route Daily.  Dispense: 1 kit; Refill: 0  -     Easy Touch Safety Lancets 28G misc; Inject 1 each under the skin into the appropriate area as directed 2 (Two) Times a Day.  Dispense: 100 each; Refill: 6  -     Accu-Chek Ruby Plus test strip; 1 each by Other route Daily.  Dispense: 100 each; Refill: 2  -     Hemoglobin A1c  -     Comprehensive Metabolic Panel  -     Lipid Panel With LDL / HDL Ratio  -     Microalbumin / Creatinine Urine Ratio - Urine, Clean Catch  -     metFORMIN (GLUCOPHAGE) 1000 MG tablet; Take 1 tablet by mouth 2 (Two) Times a Day With Meals.  Dispense: 180 tablet; Refill: 3  -     glimepiride (AMARYL) 4 MG tablet; Take 1 tablet by mouth Daily.  Dispense: 90 " tablet; Refill: 3  -     Dulaglutide (Trulicity) 1.5 MG/0.5ML solution pen-injector; Inject 1.5 mg under the skin into the appropriate area as directed 1 (One) Time Per Week.  Dispense: 4 pen; Refill: 11  -     Aspirin Low Dose 81 MG EC tablet; Take 1 tablet by mouth Daily.  Dispense: 90 tablet; Refill: 3    2. Hyperlipidemia, unspecified hyperlipidemia type  -     atorvastatin (LIPITOR) 40 MG tablet; Take 2 tablets by mouth Daily.  Dispense: 90 tablet; Refill: 3    3. Essential hypertension  -     benazepril (LOTENSIN) 40 MG tablet; Take 1 tablet by mouth Daily.  Dispense: 90 tablet; Refill: 3    4. Gastroesophageal reflux disease without esophagitis  -     omeprazole (priLOSEC) 40 MG capsule; Take 1 capsule by mouth Daily.  Dispense: 90 capsule; Refill: 3    5. Immunization due  -     Pneumococcal Polysaccharide Vaccine 23-Valent Greater Than or Equal To 3yo Subcutaneous / IM    6. Rash  -     clobetasol (TEMOVATE) 0.05 % ointment; Apply  topically to the appropriate area as directed 2 (Two) Times a Day.  Dispense: 60 g; Refill: 11      Return in about 3 months (around 2/2/2021) for DIABETES, HYPERTENSION.

## 2020-11-03 LAB
ALBUMIN SERPL-MCNC: 4.8 G/DL (ref 3.5–5.2)
ALBUMIN/CREAT UR: 13 MG/G CREAT (ref 0–29)
ALBUMIN/GLOB SERPL: 1.7 G/DL
ALP SERPL-CCNC: 91 U/L (ref 39–117)
ALT SERPL-CCNC: 28 U/L (ref 1–33)
AST SERPL-CCNC: 22 U/L (ref 1–32)
BILIRUB SERPL-MCNC: 0.2 MG/DL (ref 0–1.2)
BUN SERPL-MCNC: 10 MG/DL (ref 8–23)
BUN/CREAT SERPL: 14.1 (ref 7–25)
CALCIUM SERPL-MCNC: 10.8 MG/DL (ref 8.6–10.5)
CHLORIDE SERPL-SCNC: 102 MMOL/L (ref 98–107)
CHOLEST SERPL-MCNC: 251 MG/DL (ref 0–200)
CO2 SERPL-SCNC: 28.7 MMOL/L (ref 22–29)
CREAT SERPL-MCNC: 0.71 MG/DL (ref 0.57–1)
CREAT UR-MCNC: 127.2 MG/DL
GLOBULIN SER CALC-MCNC: 2.8 GM/DL
GLUCOSE SERPL-MCNC: 144 MG/DL (ref 65–99)
HBA1C MFR BLD: 9.8 % (ref 4.8–5.6)
HDLC SERPL-MCNC: 55 MG/DL (ref 40–60)
LDLC SERPL CALC-MCNC: 164 MG/DL (ref 0–100)
LDLC/HDLC SERPL: 2.92 {RATIO}
MICROALBUMIN UR-MCNC: 16.9 UG/ML
POTASSIUM SERPL-SCNC: 4.9 MMOL/L (ref 3.5–5.2)
PROT SERPL-MCNC: 7.6 G/DL (ref 6–8.5)
SODIUM SERPL-SCNC: 139 MMOL/L (ref 136–145)
TRIGL SERPL-MCNC: 177 MG/DL (ref 0–150)
VLDLC SERPL CALC-MCNC: 32 MG/DL (ref 5–40)

## 2020-12-23 DIAGNOSIS — E11.9 TYPE 2 DIABETES MELLITUS WITHOUT COMPLICATION, WITHOUT LONG-TERM CURRENT USE OF INSULIN (HCC): ICD-10-CM

## 2020-12-23 DIAGNOSIS — R21 RASH: ICD-10-CM

## 2020-12-24 RX ORDER — CLOBETASOL PROPIONATE 0.5 MG/G
OINTMENT TOPICAL
Qty: 120 EACH | Refills: 5 | Status: SHIPPED | OUTPATIENT
Start: 2020-12-24 | End: 2021-12-26

## 2020-12-24 RX ORDER — DULAGLUTIDE 0.75 MG/.5ML
INJECTION, SOLUTION SUBCUTANEOUS
Qty: 4 PEN | Refills: 10 | Status: SHIPPED | OUTPATIENT
Start: 2020-12-24 | End: 2021-02-02

## 2021-02-02 ENCOUNTER — OFFICE VISIT (OUTPATIENT)
Dept: FAMILY MEDICINE CLINIC | Facility: CLINIC | Age: 61
End: 2021-02-02

## 2021-02-02 VITALS
SYSTOLIC BLOOD PRESSURE: 136 MMHG | TEMPERATURE: 97.3 F | WEIGHT: 212 LBS | DIASTOLIC BLOOD PRESSURE: 78 MMHG | HEIGHT: 62 IN | BODY MASS INDEX: 39.01 KG/M2 | HEART RATE: 76 BPM | OXYGEN SATURATION: 95 %

## 2021-02-02 DIAGNOSIS — K64.0 GRADE I HEMORRHOIDS: ICD-10-CM

## 2021-02-02 DIAGNOSIS — E11.9 TYPE 2 DIABETES MELLITUS WITHOUT COMPLICATION, WITHOUT LONG-TERM CURRENT USE OF INSULIN (HCC): Primary | ICD-10-CM

## 2021-02-02 DIAGNOSIS — K59.04 CHRONIC IDIOPATHIC CONSTIPATION: ICD-10-CM

## 2021-02-02 DIAGNOSIS — M19.90 ARTHRITIS: ICD-10-CM

## 2021-02-02 DIAGNOSIS — E78.2 MIXED HYPERLIPIDEMIA: ICD-10-CM

## 2021-02-02 DIAGNOSIS — K21.9 GASTROESOPHAGEAL REFLUX DISEASE WITHOUT ESOPHAGITIS: ICD-10-CM

## 2021-02-02 DIAGNOSIS — I10 ESSENTIAL HYPERTENSION: ICD-10-CM

## 2021-02-02 PROBLEM — E66.9 OBESITY: Status: ACTIVE | Noted: 2019-12-20

## 2021-02-02 PROBLEM — E66.9 OBESITY: Status: RESOLVED | Noted: 2019-12-20 | Resolved: 2021-02-02

## 2021-02-02 PROBLEM — E78.5 HYPERLIPIDEMIA: Status: ACTIVE | Noted: 2021-02-02

## 2021-02-02 PROBLEM — E78.5 HYPERLIPIDEMIA: Status: RESOLVED | Noted: 2021-02-02 | Resolved: 2021-02-02

## 2021-02-02 PROCEDURE — 99214 OFFICE O/P EST MOD 30 MIN: CPT | Performed by: FAMILY MEDICINE

## 2021-02-02 RX ORDER — HYDROCORTISONE ACETATE 25 MG/1
25 SUPPOSITORY RECTAL 2 TIMES DAILY
Qty: 20 SUPPOSITORY | Refills: 11 | Status: SHIPPED | OUTPATIENT
Start: 2021-02-02

## 2021-02-02 RX ORDER — IBUPROFEN 800 MG/1
800 TABLET ORAL EVERY 8 HOURS PRN
Qty: 90 TABLET | Refills: 3 | Status: SHIPPED | OUTPATIENT
Start: 2021-02-02 | End: 2022-01-20

## 2021-02-02 RX ORDER — BLOOD SUGAR DIAGNOSTIC
1 STRIP MISCELLANEOUS DAILY
Qty: 100 EACH | Refills: 11 | Status: SHIPPED | OUTPATIENT
Start: 2021-02-02 | End: 2022-01-28 | Stop reason: SDUPTHER

## 2021-02-02 RX ORDER — DULAGLUTIDE 3 MG/.5ML
3 INJECTION, SOLUTION SUBCUTANEOUS WEEKLY
Qty: 4 PEN | Refills: 11 | Status: SHIPPED | OUTPATIENT
Start: 2021-02-02 | End: 2021-05-11

## 2021-02-02 RX ORDER — BISACODYL 10 MG
10 SUPPOSITORY, RECTAL RECTAL DAILY
Qty: 30 SUPPOSITORY | Refills: 11 | Status: SHIPPED | OUTPATIENT
Start: 2021-02-02

## 2021-02-02 NOTE — PROGRESS NOTES
Subjective   Queenie Lubin is a 60 y.o. female.     Chief Complaint   Patient presents with   • Diabetes   • Follow-up       She is 59-year-old with multiple chronic medical conditions        Hypertension  This is a chronic problem. The current episode started more than 1 year ago. The problem is unchanged. The problem is controlled. Pertinent negatives include no anxiety. There are no associated agents to hypertension. Risk factors for coronary artery disease include diabetes mellitus and dyslipidemia. Past treatments include ACE inhibitors. Current antihypertension treatment includes ACE inhibitors. The current treatment provides significant improvement. There are no compliance problems.  There is no history of angina, kidney disease or CAD/MI.   Diabetes  She presents for her follow-up diabetic visit. She has type 2 diabetes mellitus. Her disease course has been stable. There are no hypoglycemic complications. Symptoms are stable. She is following a diabetic diet. She never participates in exercise. An ACE inhibitor/angiotensin II receptor blocker is being taken. She does not see a podiatrist.Eye exam is current.   Hyperlipidemia  This is a chronic problem. The problem is controlled. Recent lipid tests were reviewed and are normal. Associated symptoms include myalgias. Current antihyperlipidemic treatment includes statins.   Heartburn  This is a chronic problem. The current episode started more than 1 year ago. The problem occurs frequently. The problem has been gradually worsening. The symptoms are aggravated by certain foods. She has tried a PPI for the symptoms. The treatment provided moderate (noncompliant) relief.            The following portions of the patient's history were reviewed and updated as appropriate: allergies, current medications, past family history, past medical history, past social history, past surgical history and problem list.    Past Medical History:   Diagnosis Date   • Diabetes  mellitus (CMS/HCC)    • GERD (gastroesophageal reflux disease)    • Hyperlipidemia    • Hypertension        History reviewed. No pertinent surgical history.    Family History   Problem Relation Age of Onset   • Heart disease Mother        Social History     Socioeconomic History   • Marital status:      Spouse name: Not on file   • Number of children: Not on file   • Years of education: Not on file   • Highest education level: Not on file   Tobacco Use   • Smoking status: Never Smoker   • Smokeless tobacco: Never Used   Substance and Sexual Activity   • Alcohol use: Defer   • Drug use: Defer   • Sexual activity: Defer       Current Outpatient Medications on File Prior to Visit   Medication Sig Dispense Refill   • acyclovir (ZOVIRAX) 400 MG tablet      • Aspirin Low Dose 81 MG EC tablet Take 1 tablet by mouth Daily. 90 tablet 3   • atorvastatin (LIPITOR) 40 MG tablet Take 2 tablets by mouth Daily. 90 tablet 3   • benazepril (LOTENSIN) 40 MG tablet Take 1 tablet by mouth Daily. 90 tablet 3   • Blood Glucose Monitoring Suppl (Accu-Chek Ruby Plus) w/Device kit 1 each by Other route Daily. 1 kit 0   • clobetasol (TEMOVATE) 0.05 % ointment APPLY TO AFFECTED AREA(S) TWO TIMES A  each 5   • diclofenac (VOLTAREN) 1 % gel gel Apply  topically to the appropriate area as directed 4 (Four) Times a Day. 3 tube 6   • Easy Touch Safety Lancets 28G misc Inject 1 each under the skin into the appropriate area as directed 2 (Two) Times a Day. 100 each 6   • glimepiride (AMARYL) 4 MG tablet Take 1 tablet by mouth Daily. 90 tablet 3   • glucose monitor monitoring kit 1 each Daily. 1 each 0   • metFORMIN (GLUCOPHAGE) 1000 MG tablet Take 1 tablet by mouth 2 (Two) Times a Day With Meals. 180 tablet 3   • omeprazole (priLOSEC) 40 MG capsule Take 1 capsule by mouth Daily. 90 capsule 3   • [DISCONTINUED] Accu-Chek Ruby Plus test strip 1 each by Other route Daily. 100 each 2   • [DISCONTINUED] Dulaglutide (Trulicity) 1.5  MG/0.5ML solution pen-injector Inject 1.5 mg under the skin into the appropriate area as directed 1 (One) Time Per Week. 4 pen 11   • [DISCONTINUED] Trulicity 0.75 MG/0.5ML solution pen-injector INJECT 0.75 MG UNDER THE SKIN ONCE WEEKLY 4 pen 10   • prednisoLONE acetate (PRED FORTE) 1 % ophthalmic suspension 1 drop 4 (Four) Times a Day. Instill one drop in the left eye three times a day for one week. Instil one drop in the left eye for one week  2 times a day for one week. Instill one drop in the left eye once daily  As maintenance.     • [DISCONTINUED] Diclofenac Sodium (VOLTAREN) 1 % gel gel APPLY 2 GRAMS TOPICALLY TO AFFECTED AREA FOUR TIMES A DAY AS DIRECTED 350 g 5   • [DISCONTINUED] Diclofenac Sodium (VOLTAREN) 1 % gel gel Place  on the skin as directed by provider 4 (Four) Times a Day.       No current facility-administered medications on file prior to visit.        Review of Systems   Gastrointestinal: Positive for GERD.   Musculoskeletal: Positive for arthralgias and myalgias.   All other systems reviewed and are negative.      Recent Results (from the past 4704 hour(s))   Hemoglobin A1c    Collection Time: 11/02/20 10:49 AM    Specimen: Blood   Result Value Ref Range    Hemoglobin A1C 9.80 (H) 4.80 - 5.60 %   Comprehensive Metabolic Panel    Collection Time: 11/02/20 10:49 AM    Specimen: Blood   Result Value Ref Range    Glucose 144 (H) 65 - 99 mg/dL    BUN 10 8 - 23 mg/dL    Creatinine 0.71 0.57 - 1.00 mg/dL    eGFR Non African Am 84 >60 mL/min/1.73    eGFR African Am 102 >60 mL/min/1.73    BUN/Creatinine Ratio 14.1 7.0 - 25.0    Sodium 139 136 - 145 mmol/L    Potassium 4.9 3.5 - 5.2 mmol/L    Chloride 102 98 - 107 mmol/L    Total CO2 28.7 22.0 - 29.0 mmol/L    Calcium 10.8 (H) 8.6 - 10.5 mg/dL    Total Protein 7.6 6.0 - 8.5 g/dL    Albumin 4.80 3.50 - 5.20 g/dL    Globulin 2.8 gm/dL    A/G Ratio 1.7 g/dL    Total Bilirubin 0.2 0.0 - 1.2 mg/dL    Alkaline Phosphatase 91 39 - 117 U/L    AST (SGOT) 22 1 -  "32 U/L    ALT (SGPT) 28 1 - 33 U/L   Lipid Panel With LDL / HDL Ratio    Collection Time: 11/02/20 10:49 AM    Specimen: Blood   Result Value Ref Range    Total Cholesterol 251 (H) 0 - 200 mg/dL    Triglycerides 177 (H) 0 - 150 mg/dL    HDL Cholesterol 55 40 - 60 mg/dL    VLDL Cholesterol Fred 32 5 - 40 mg/dL    LDL Chol Calc (NIH) 164 (H) 0 - 100 mg/dL    LDL/HDL RATIO 2.92    Microalbumin / Creatinine Urine Ratio - Urine, Clean Catch    Collection Time: 11/02/20 10:49 AM    Specimen: Urine, Clean Catch   Result Value Ref Range    Creatinine, Urine 127.2 Not Estab. mg/dL    Microalbumin, Urine 16.9 Not Estab. ug/mL    Microalbumin/Creatinine Ratio 13 0 - 29 mg/g creat     Objective   Vitals:    02/02/21 0823   BP: 136/78   Pulse: 76   Temp: 97.3 °F (36.3 °C)   SpO2: 95%   Weight: 96.2 kg (212 lb)   Height: 157.7 cm (62.09\")     Body mass index is 38.67 kg/m².  Physical Exam   Constitutional: She is oriented to person, place, and time. She appears well-developed and well-nourished.   obese   HENT:   Head: Normocephalic and atraumatic.   Right Ear: External ear normal.   Left Ear: External ear normal.   Nose: Nose normal.   Mouth/Throat: Oropharynx is clear and moist.   Eyes: Pupils are equal, round, and reactive to light. Conjunctivae are normal.   Neck: Normal range of motion. Neck supple. No tracheal deviation present. No thyromegaly present.   Cardiovascular: Normal rate, regular rhythm and normal heart sounds. Exam reveals no gallop and no friction rub.   No murmur heard.  Pulmonary/Chest: Effort normal and breath sounds normal. No respiratory distress. She exhibits no tenderness.   Abdominal: Soft. Bowel sounds are normal. She exhibits no distension. There is no abdominal tenderness.   Musculoskeletal: Normal range of motion. No tenderness.   Lymphadenopathy:     She has no cervical adenopathy.   Neurological: She is alert and oriented to person, place, and time. She displays normal reflexes. No cranial nerve " deficit or sensory deficit. Coordination normal.   Skin: Skin is warm and dry.   Psychiatric: Her behavior is normal. Judgment and thought content normal.   Nursing note and vitals reviewed.        Assessment/Plan   Diagnoses and all orders for this visit:    1. Type 2 diabetes mellitus without complication, without long-term current use of insulin (CMS/[facility]) (Primary)  Comments:  Uncontrolled.  Noncompliant with the weight loss.  Continue current medications.  We will increase Trulicity to milligrams weekly.  Labs today  Orders:  -     Hemoglobin A1c  -     Comprehensive Metabolic Panel  -     Lipid Panel With LDL / HDL Ratio  -     Microalbumin / Creatinine Urine Ratio - Urine, Clean Catch  -     Accu-Chek Ruby Plus test strip; 1 each by Other route Daily.  Dispense: 100 each; Refill: 11  -     Dulaglutide (Trulicity) 3 MG/0.5ML solution pen-injector; Inject 3 mg under the skin into the appropriate area as directed 1 (One) Time Per Week.  Dispense: 4 pen; Refill: 11    2. Essential hypertension  Comments:  Stable.  Labs today  Orders:  -     Comprehensive Metabolic Panel  -     Lipid Panel With LDL / HDL Ratio  -     Microalbumin / Creatinine Urine Ratio - Urine, Clean Catch    3. Mixed hyperlipidemia  Comments:  Stable.  Labs today  Orders:  -     Comprehensive Metabolic Panel  -     Lipid Panel With LDL / HDL Ratio    4. Arthritis  -     ibuprofen (ADVIL,MOTRIN) 800 MG tablet; Take 1 tablet by mouth Every 8 (Eight) Hours As Needed for Mild Pain .  Dispense: 90 tablet; Refill: 3    5. Gastroesophageal reflux disease without esophagitis  Comments:  Stable.  Continue medications    6. Chronic idiopathic constipation  Comments:  Complaint.  New diagnosis.  I will try suppository  Orders:  -     bisacodyl (Dulcolax) 10 MG suppository; Insert 1 suppository into the rectum Daily.  Dispense: 30 suppository; Refill: 11    7. Grade I hemorrhoids  Comments:  New complaint.  New diagnosis.  We will try  suppositories  Orders:  -     hydrocortisone (ANUSOL-HC) 25 MG suppository; Insert 1 suppository into the rectum 2 (Two) Times a Day.  Dispense: 20 suppository; Refill: 11      Return in about 3 months (around 5/2/2021) for DIABETES.      Pt had complete work up for CP which was negative by cardiology within last year.       Physical Exam  Vitals signs and nursing note reviewed.   Constitutional:       Appearance: She is well-developed and well-nourished.      Comments: obese   HENT:      Head: Normocephalic and atraumatic.      Right Ear: External ear normal.      Left Ear: External ear normal.      Nose: Nose normal.      Mouth/Throat:      Mouth: Oropharynx is clear and moist.   Eyes:      Conjunctiva/sclera: Conjunctivae normal.      Pupils: Pupils are equal, round, and reactive to light.   Neck:      Musculoskeletal: Normal range of motion and neck supple.      Thyroid: No thyromegaly.      Trachea: No tracheal deviation.   Cardiovascular:      Rate and Rhythm: Normal rate and regular rhythm.      Heart sounds: Normal heart sounds. No murmur. No friction rub. No gallop.    Pulmonary:      Effort: Pulmonary effort is normal. No respiratory distress.      Breath sounds: Normal breath sounds.   Chest:      Chest wall: No tenderness.   Abdominal:      General: Bowel sounds are normal. There is no distension.      Palpations: Abdomen is soft.      Tenderness: There is no abdominal tenderness.   Musculoskeletal: Normal range of motion.         General: No tenderness.   Lymphadenopathy:      Cervical: No cervical adenopathy.   Skin:     General: Skin is warm and dry.   Neurological:      Mental Status: She is alert and oriented to person, place, and time.      Cranial Nerves: No cranial nerve deficit.      Sensory: No sensory deficit.      Coordination: Coordination normal.      Deep Tendon Reflexes: Reflexes normal.   Psychiatric:         Behavior: Behavior normal.         Thought Content: Thought content normal.          Judgment: Judgment normal.

## 2021-02-03 LAB
ALBUMIN SERPL-MCNC: 4.5 G/DL (ref 3.5–5.2)
ALBUMIN/CREAT UR: 26 MG/G CREAT (ref 0–29)
ALBUMIN/GLOB SERPL: 1.5 G/DL
ALP SERPL-CCNC: 92 U/L (ref 39–117)
ALT SERPL-CCNC: 23 U/L (ref 1–33)
AST SERPL-CCNC: 24 U/L (ref 1–32)
BILIRUB SERPL-MCNC: 0.3 MG/DL (ref 0–1.2)
BUN SERPL-MCNC: 10 MG/DL (ref 8–23)
BUN/CREAT SERPL: 14.3 (ref 7–25)
CALCIUM SERPL-MCNC: 10.8 MG/DL (ref 8.6–10.5)
CHLORIDE SERPL-SCNC: 103 MMOL/L (ref 98–107)
CHOLEST SERPL-MCNC: 255 MG/DL (ref 0–200)
CO2 SERPL-SCNC: 29 MMOL/L (ref 22–29)
CREAT SERPL-MCNC: 0.7 MG/DL (ref 0.57–1)
CREAT UR-MCNC: 134.4 MG/DL
GLOBULIN SER CALC-MCNC: 3.1 GM/DL
GLUCOSE SERPL-MCNC: 242 MG/DL (ref 65–99)
HBA1C MFR BLD: 11.2 % (ref 4.8–5.6)
HDLC SERPL-MCNC: 48 MG/DL (ref 40–60)
LDLC SERPL CALC-MCNC: 162 MG/DL (ref 0–100)
LDLC/HDLC SERPL: 3.31 {RATIO}
MICROALBUMIN UR-MCNC: 34.9 UG/ML
POTASSIUM SERPL-SCNC: 4.9 MMOL/L (ref 3.5–5.2)
PROT SERPL-MCNC: 7.6 G/DL (ref 6–8.5)
SODIUM SERPL-SCNC: 139 MMOL/L (ref 136–145)
TRIGL SERPL-MCNC: 240 MG/DL (ref 0–150)
VLDLC SERPL CALC-MCNC: 45 MG/DL (ref 5–40)

## 2021-03-04 DIAGNOSIS — R21 RASH: ICD-10-CM

## 2021-03-05 RX ORDER — CLOBETASOL PROPIONATE 0.5 MG/G
OINTMENT TOPICAL
Qty: 120 EACH | Refills: 5 | OUTPATIENT
Start: 2021-03-05

## 2021-03-05 NOTE — TELEPHONE ENCOUNTER
Ok for hub to relay message:      Clobetasol has 5 refills. Patient to reach out to pharmacy for refill.

## 2021-05-11 ENCOUNTER — OFFICE VISIT (OUTPATIENT)
Dept: FAMILY MEDICINE CLINIC | Facility: CLINIC | Age: 61
End: 2021-05-11

## 2021-05-11 VITALS
DIASTOLIC BLOOD PRESSURE: 75 MMHG | HEART RATE: 76 BPM | TEMPERATURE: 97.7 F | BODY MASS INDEX: 40.03 KG/M2 | SYSTOLIC BLOOD PRESSURE: 129 MMHG | HEIGHT: 62 IN | WEIGHT: 217.5 LBS | OXYGEN SATURATION: 98 %

## 2021-05-11 DIAGNOSIS — E11.9 TYPE 2 DIABETES MELLITUS WITHOUT COMPLICATION, WITHOUT LONG-TERM CURRENT USE OF INSULIN (HCC): Primary | ICD-10-CM

## 2021-05-11 DIAGNOSIS — E78.2 MIXED HYPERLIPIDEMIA: ICD-10-CM

## 2021-05-11 DIAGNOSIS — I10 ESSENTIAL HYPERTENSION: ICD-10-CM

## 2021-05-11 DIAGNOSIS — K21.9 GASTROESOPHAGEAL REFLUX DISEASE WITHOUT ESOPHAGITIS: ICD-10-CM

## 2021-05-11 PROCEDURE — 99214 OFFICE O/P EST MOD 30 MIN: CPT | Performed by: FAMILY MEDICINE

## 2021-05-11 RX ORDER — LIFITEGRAST 50 MG/ML
SOLUTION/ DROPS OPHTHALMIC
COMMUNITY
Start: 2021-02-11

## 2021-05-11 RX ORDER — DULAGLUTIDE 0.75 MG/.5ML
INJECTION, SOLUTION SUBCUTANEOUS
COMMUNITY
Start: 2021-04-16 | End: 2021-05-11

## 2021-05-11 RX ORDER — DULAGLUTIDE 1.5 MG/.5ML
1.5 INJECTION, SOLUTION SUBCUTANEOUS WEEKLY
Qty: 4 PEN | Refills: 11 | Status: SHIPPED | OUTPATIENT
Start: 2021-05-11 | End: 2021-11-22 | Stop reason: SDUPTHER

## 2021-05-11 NOTE — PROGRESS NOTES
Cristino Lubin is a 61 y.o. female.     Chief Complaint   Patient presents with   • Diabetes   • Follow-up     A1c labs need to be completed       History of Present Illness   Follow-up on diabetes.  Follow-up on hypertension.  Follow-up on hyperlipidemia.      The following portions of the patient's history were reviewed and updated as appropriate: allergies, current medications, past family history, past medical history, past social history, past surgical history and problem list.    Past Medical History:   Diagnosis Date   • Diabetes mellitus (CMS/HCC)    • GERD (gastroesophageal reflux disease)    • Hyperlipidemia    • Hypertension        No past surgical history on file.    Family History   Problem Relation Age of Onset   • Heart disease Mother        Social History     Socioeconomic History   • Marital status:      Spouse name: Not on file   • Number of children: Not on file   • Years of education: Not on file   • Highest education level: Not on file   Tobacco Use   • Smoking status: Never Smoker   • Smokeless tobacco: Never Used   Substance and Sexual Activity   • Alcohol use: Defer   • Drug use: Defer   • Sexual activity: Defer       Current Outpatient Medications on File Prior to Visit   Medication Sig Dispense Refill   • Accu-Chek Ruby Plus test strip 1 each by Other route Daily. 100 each 11   • acyclovir (ZOVIRAX) 400 MG tablet      • Aspirin Low Dose 81 MG EC tablet Take 1 tablet by mouth Daily. 90 tablet 3   • atorvastatin (LIPITOR) 40 MG tablet Take 2 tablets by mouth Daily. 90 tablet 3   • benazepril (LOTENSIN) 40 MG tablet Take 1 tablet by mouth Daily. 90 tablet 3   • bisacodyl (Dulcolax) 10 MG suppository Insert 1 suppository into the rectum Daily. 30 suppository 11   • Blood Glucose Monitoring Suppl (Accu-Chek Ruby Plus) w/Device kit 1 each by Other route Daily. 1 kit 0   • clobetasol (TEMOVATE) 0.05 % ointment APPLY TO AFFECTED AREA(S) TWO TIMES A  each 5   •  diclofenac (VOLTAREN) 1 % gel gel Apply  topically to the appropriate area as directed 4 (Four) Times a Day. 3 tube 6   • Diclofenac Sodium (VOLTAREN) 1 % gel gel      • Easy Touch Safety Lancets 28G misc Inject 1 each under the skin into the appropriate area as directed 2 (Two) Times a Day. 100 each 6   • glimepiride (AMARYL) 4 MG tablet Take 1 tablet by mouth Daily. 90 tablet 3   • glucose monitor monitoring kit 1 each Daily. 1 each 0   • hydrocortisone (ANUSOL-HC) 25 MG suppository Insert 1 suppository into the rectum 2 (Two) Times a Day. 20 suppository 11   • ibuprofen (ADVIL,MOTRIN) 800 MG tablet Take 1 tablet by mouth Every 8 (Eight) Hours As Needed for Mild Pain . 90 tablet 3   • metFORMIN (GLUCOPHAGE) 1000 MG tablet Take 1 tablet by mouth 2 (Two) Times a Day With Meals. 180 tablet 3   • omeprazole (priLOSEC) 40 MG capsule Take 1 capsule by mouth Daily. 90 capsule 3   • Xiidra 5 % ophthalmic solution      • [DISCONTINUED] Dulaglutide (Trulicity) 3 MG/0.5ML solution pen-injector Inject 3 mg under the skin into the appropriate area as directed 1 (One) Time Per Week. 4 pen 11   • [DISCONTINUED] Trulicity 0.75 MG/0.5ML solution pen-injector        No current facility-administered medications on file prior to visit.       Review of Systems   Constitutional: Negative.    All other systems reviewed and are negative.      Recent Results (from the past 4704 hour(s))   Hemoglobin A1c    Collection Time: 11/02/20 10:49 AM    Specimen: Blood   Result Value Ref Range    Hemoglobin A1C 9.80 (H) 4.80 - 5.60 %   Comprehensive Metabolic Panel    Collection Time: 11/02/20 10:49 AM    Specimen: Blood   Result Value Ref Range    Glucose 144 (H) 65 - 99 mg/dL    BUN 10 8 - 23 mg/dL    Creatinine 0.71 0.57 - 1.00 mg/dL    eGFR Non African Am 84 >60 mL/min/1.73    eGFR African Am 102 >60 mL/min/1.73    BUN/Creatinine Ratio 14.1 7.0 - 25.0    Sodium 139 136 - 145 mmol/L    Potassium 4.9 3.5 - 5.2 mmol/L    Chloride 102 98 - 107  mmol/L    Total CO2 28.7 22.0 - 29.0 mmol/L    Calcium 10.8 (H) 8.6 - 10.5 mg/dL    Total Protein 7.6 6.0 - 8.5 g/dL    Albumin 4.80 3.50 - 5.20 g/dL    Globulin 2.8 gm/dL    A/G Ratio 1.7 g/dL    Total Bilirubin 0.2 0.0 - 1.2 mg/dL    Alkaline Phosphatase 91 39 - 117 U/L    AST (SGOT) 22 1 - 32 U/L    ALT (SGPT) 28 1 - 33 U/L   Lipid Panel With LDL / HDL Ratio    Collection Time: 11/02/20 10:49 AM    Specimen: Blood   Result Value Ref Range    Total Cholesterol 251 (H) 0 - 200 mg/dL    Triglycerides 177 (H) 0 - 150 mg/dL    HDL Cholesterol 55 40 - 60 mg/dL    VLDL Cholesterol Fred 32 5 - 40 mg/dL    LDL Chol Calc (NIH) 164 (H) 0 - 100 mg/dL    LDL/HDL RATIO 2.92    Microalbumin / Creatinine Urine Ratio - Urine, Clean Catch    Collection Time: 11/02/20 10:49 AM    Specimen: Urine, Clean Catch   Result Value Ref Range    Creatinine, Urine 127.2 Not Estab. mg/dL    Microalbumin, Urine 16.9 Not Estab. ug/mL    Microalbumin/Creatinine Ratio 13 0 - 29 mg/g creat   Hemoglobin A1c    Collection Time: 02/02/21  8:46 AM    Specimen: Blood   Result Value Ref Range    Hemoglobin A1C 11.20 (H) 4.80 - 5.60 %   Comprehensive Metabolic Panel    Collection Time: 02/02/21  8:46 AM    Specimen: Blood   Result Value Ref Range    Glucose 242 (H) 65 - 99 mg/dL    BUN 10 8 - 23 mg/dL    Creatinine 0.70 0.57 - 1.00 mg/dL    eGFR Non African Am 85 >60 mL/min/1.73    eGFR African Am 103 >60 mL/min/1.73    BUN/Creatinine Ratio 14.3 7.0 - 25.0    Sodium 139 136 - 145 mmol/L    Potassium 4.9 3.5 - 5.2 mmol/L    Chloride 103 98 - 107 mmol/L    Total CO2 29.0 22.0 - 29.0 mmol/L    Calcium 10.8 (H) 8.6 - 10.5 mg/dL    Total Protein 7.6 6.0 - 8.5 g/dL    Albumin 4.50 3.50 - 5.20 g/dL    Globulin 3.1 gm/dL    A/G Ratio 1.5 g/dL    Total Bilirubin 0.3 0.0 - 1.2 mg/dL    Alkaline Phosphatase 92 39 - 117 U/L    AST (SGOT) 24 1 - 32 U/L    ALT (SGPT) 23 1 - 33 U/L   Lipid Panel With LDL / HDL Ratio    Collection Time: 02/02/21  8:46 AM    Specimen:  "Blood   Result Value Ref Range    Total Cholesterol 255 (H) 0 - 200 mg/dL    Triglycerides 240 (H) 0 - 150 mg/dL    HDL Cholesterol 48 40 - 60 mg/dL    VLDL Cholesterol Fred 45 (H) 5 - 40 mg/dL    LDL Chol Calc (NIH) 162 (H) 0 - 100 mg/dL    LDL/HDL RATIO 3.31    Microalbumin / Creatinine Urine Ratio - Urine, Clean Catch    Collection Time: 02/02/21  8:46 AM    Specimen: Urine, Clean Catch   Result Value Ref Range    Creatinine, Urine 134.4 Not Estab. mg/dL    Microalbumin, Urine 34.9 Not Estab. ug/mL    Microalbumin/Creatinine Ratio 26 0 - 29 mg/g creat     Objective   Vitals:    05/11/21 0823   BP: 129/75   Pulse: 76   Temp: 97.7 °F (36.5 °C)   SpO2: 98%   Weight: 98.7 kg (217 lb 8 oz)   Height: 157.7 cm (62.09\")     Body mass index is 39.67 kg/m².  Physical Exam  Vitals and nursing note reviewed.   Constitutional:       General: She is not in acute distress.     Appearance: She is well-developed. She is not diaphoretic.   Cardiovascular:      Rate and Rhythm: Normal rate and regular rhythm.   Pulmonary:      Effort: Pulmonary effort is normal. No respiratory distress.      Breath sounds: Normal breath sounds. No wheezing.           Diagnoses and all orders for this visit:    1. Type 2 diabetes mellitus without complication, without long-term current use of insulin (CMS/[facility]) (Primary)  Comments:  Uncontrolled.  Patient is very resistant to therapy change.  I did convince her that we need to get Trulicity up to 1.5 mg weekly  Orders:  -     Dulaglutide (Trulicity) 1.5 MG/0.5ML solution pen-injector; Inject 1.5 mg under the skin into the appropriate area as directed 1 (One) Time Per Week.  Dispense: 4 pen; Refill: 11  -     Hemoglobin A1c  -     Comprehensive Metabolic Panel  -     Lipid Panel With LDL / HDL Ratio    2. Mixed hyperlipidemia    3. Essential hypertension    4. Gastroesophageal reflux disease without esophagitis           Return in about 3 months (around 8/11/2021) for DIABETES.      "

## 2021-05-12 LAB
ALBUMIN SERPL-MCNC: 4.5 G/DL (ref 3.5–5.2)
ALBUMIN/GLOB SERPL: 1.6 G/DL
ALP SERPL-CCNC: 89 U/L (ref 39–117)
ALT SERPL-CCNC: 22 U/L (ref 1–33)
AST SERPL-CCNC: 22 U/L (ref 1–32)
BILIRUB SERPL-MCNC: 0.2 MG/DL (ref 0–1.2)
BUN SERPL-MCNC: 12 MG/DL (ref 8–23)
BUN/CREAT SERPL: 19.4 (ref 7–25)
CALCIUM SERPL-MCNC: 10.9 MG/DL (ref 8.6–10.5)
CHLORIDE SERPL-SCNC: 105 MMOL/L (ref 98–107)
CHOLEST SERPL-MCNC: 247 MG/DL (ref 0–200)
CO2 SERPL-SCNC: 26 MMOL/L (ref 22–29)
CREAT SERPL-MCNC: 0.62 MG/DL (ref 0.57–1)
GLOBULIN SER CALC-MCNC: 2.9 GM/DL
GLUCOSE SERPL-MCNC: 176 MG/DL (ref 65–99)
HBA1C MFR BLD: 7.5 % (ref 4.8–5.6)
HDLC SERPL-MCNC: 51 MG/DL (ref 40–60)
LDLC SERPL CALC-MCNC: 157 MG/DL (ref 0–100)
LDLC/HDLC SERPL: 3.01 {RATIO}
POTASSIUM SERPL-SCNC: 4.4 MMOL/L (ref 3.5–5.2)
PROT SERPL-MCNC: 7.4 G/DL (ref 6–8.5)
SODIUM SERPL-SCNC: 139 MMOL/L (ref 136–145)
TRIGL SERPL-MCNC: 212 MG/DL (ref 0–150)
VLDLC SERPL CALC-MCNC: 39 MG/DL (ref 5–40)

## 2021-06-24 DIAGNOSIS — K21.9 GASTROESOPHAGEAL REFLUX DISEASE WITHOUT ESOPHAGITIS: ICD-10-CM

## 2021-06-24 RX ORDER — OMEPRAZOLE 40 MG/1
CAPSULE, DELAYED RELEASE ORAL
Qty: 90 CAPSULE | Refills: 2 | Status: SHIPPED | OUTPATIENT
Start: 2021-06-24 | End: 2021-12-20

## 2021-09-09 DIAGNOSIS — E11.9 TYPE 2 DIABETES MELLITUS WITHOUT COMPLICATION, WITHOUT LONG-TERM CURRENT USE OF INSULIN (HCC): ICD-10-CM

## 2021-09-09 RX ORDER — ASPIRIN 81 MG/1
TABLET, COATED ORAL
Qty: 90 TABLET | Refills: 3 | Status: SHIPPED | OUTPATIENT
Start: 2021-09-09 | End: 2022-09-16

## 2021-11-22 ENCOUNTER — OFFICE VISIT (OUTPATIENT)
Dept: FAMILY MEDICINE CLINIC | Facility: CLINIC | Age: 61
End: 2021-11-22

## 2021-11-22 VITALS
HEART RATE: 82 BPM | SYSTOLIC BLOOD PRESSURE: 134 MMHG | OXYGEN SATURATION: 98 % | HEIGHT: 62 IN | WEIGHT: 207.2 LBS | TEMPERATURE: 98.2 F | DIASTOLIC BLOOD PRESSURE: 80 MMHG | BODY MASS INDEX: 38.13 KG/M2

## 2021-11-22 DIAGNOSIS — G89.29 CHRONIC PAIN OF BOTH KNEES: ICD-10-CM

## 2021-11-22 DIAGNOSIS — E78.5 HYPERLIPIDEMIA, UNSPECIFIED HYPERLIPIDEMIA TYPE: ICD-10-CM

## 2021-11-22 DIAGNOSIS — M25.562 CHRONIC PAIN OF BOTH KNEES: ICD-10-CM

## 2021-11-22 DIAGNOSIS — R07.2 PRECORDIAL PAIN: ICD-10-CM

## 2021-11-22 DIAGNOSIS — K21.9 GASTROESOPHAGEAL REFLUX DISEASE WITHOUT ESOPHAGITIS: ICD-10-CM

## 2021-11-22 DIAGNOSIS — E78.2 MIXED HYPERLIPIDEMIA: ICD-10-CM

## 2021-11-22 DIAGNOSIS — M25.561 CHRONIC PAIN OF BOTH KNEES: ICD-10-CM

## 2021-11-22 DIAGNOSIS — Z91.199 MEDICALLY NONCOMPLIANT: ICD-10-CM

## 2021-11-22 DIAGNOSIS — I10 PRIMARY HYPERTENSION: ICD-10-CM

## 2021-11-22 DIAGNOSIS — Z28.21 IMMUNIZATION REFUSED: ICD-10-CM

## 2021-11-22 DIAGNOSIS — E11.9 TYPE 2 DIABETES MELLITUS WITHOUT COMPLICATION, WITHOUT LONG-TERM CURRENT USE OF INSULIN (HCC): ICD-10-CM

## 2021-11-22 DIAGNOSIS — I10 ESSENTIAL HYPERTENSION: ICD-10-CM

## 2021-11-22 DIAGNOSIS — M19.90 ARTHRITIS: ICD-10-CM

## 2021-11-22 DIAGNOSIS — E11.9 TYPE 2 DIABETES MELLITUS WITHOUT COMPLICATION, WITHOUT LONG-TERM CURRENT USE OF INSULIN (HCC): Primary | ICD-10-CM

## 2021-11-22 PROCEDURE — 73560 X-RAY EXAM OF KNEE 1 OR 2: CPT | Performed by: FAMILY MEDICINE

## 2021-11-22 PROCEDURE — 93000 ELECTROCARDIOGRAM COMPLETE: CPT | Performed by: FAMILY MEDICINE

## 2021-11-22 PROCEDURE — 99214 OFFICE O/P EST MOD 30 MIN: CPT | Performed by: FAMILY MEDICINE

## 2021-11-22 RX ORDER — BENAZEPRIL HYDROCHLORIDE 40 MG/1
40 TABLET, FILM COATED ORAL DAILY
Qty: 90 TABLET | Refills: 3 | Status: SHIPPED | OUTPATIENT
Start: 2021-11-22 | End: 2022-12-13

## 2021-11-22 RX ORDER — DULAGLUTIDE 1.5 MG/.5ML
1.5 INJECTION, SOLUTION SUBCUTANEOUS WEEKLY
Qty: 4 PEN | Refills: 11 | Status: SHIPPED | OUTPATIENT
Start: 2021-11-22 | End: 2021-11-30 | Stop reason: SDUPTHER

## 2021-11-22 RX ORDER — ATORVASTATIN CALCIUM 40 MG/1
80 TABLET, FILM COATED ORAL DAILY
Qty: 90 TABLET | Refills: 3 | Status: SHIPPED | OUTPATIENT
Start: 2021-11-22 | End: 2022-07-06

## 2021-11-22 NOTE — PROGRESS NOTES
Subjective   Queenie Lubin is a 61 y.o. female.     Chief Complaint   Patient presents with   • Diabetes       History of Present Illness   Patient is here with multiple medical complaints.  She is a complex patient with multiple medical problems.  Diabetes is now much better control with A1c recently at 7.5 since we started Trulicity.  Hyperlipidemia stable.  Hypertension stable.  GERD stable on current medications.  Labs today.  She is complaining of bilateral knee pains and would like her to be seen by an orthopedic.  However with only one half any type of work-up or x-rays we will do x-rays today  She is also complaining on the chest pain on and off.  She does not have any acute chest pain at this time and office however staff states that she is having chest pains for the last 6 months and did not seek any medical attention  The following portions of the patient's history were reviewed and updated as appropriate: allergies, current medications, past family history, past medical history, past social history, past surgical history and problem list.    Past Medical History:   Diagnosis Date   • Diabetes mellitus (HCC)    • GERD (gastroesophageal reflux disease)    • Hyperlipidemia    • Hypertension        History reviewed. No pertinent surgical history.    Family History   Problem Relation Age of Onset   • Heart disease Mother        Social History     Socioeconomic History   • Marital status:    Tobacco Use   • Smoking status: Never Smoker   • Smokeless tobacco: Never Used   Substance and Sexual Activity   • Alcohol use: Defer   • Drug use: Defer   • Sexual activity: Defer       Current Outpatient Medications on File Prior to Visit   Medication Sig Dispense Refill   • Accu-Chek Ruby Plus test strip 1 each by Other route Daily. 100 each 11   • acyclovir (ZOVIRAX) 400 MG tablet      • Aspirin Low Dose 81 MG EC tablet TAKE ONE TABLET BY MOUTH DAILY 90 tablet 3   • bisacodyl (Dulcolax) 10 MG suppository  Insert 1 suppository into the rectum Daily. 30 suppository 11   • Blood Glucose Monitoring Suppl (Accu-Chek Ruby Plus) w/Device kit 1 each by Other route Daily. 1 kit 0   • clobetasol (TEMOVATE) 0.05 % ointment APPLY TO AFFECTED AREA(S) TWO TIMES A  each 5   • diclofenac (VOLTAREN) 1 % gel gel Apply  topically to the appropriate area as directed 4 (Four) Times a Day. 3 tube 6   • Diclofenac Sodium (VOLTAREN) 1 % gel gel      • Easy Touch Safety Lancets 28G misc Inject 1 each under the skin into the appropriate area as directed 2 (Two) Times a Day. 100 each 6   • glimepiride (AMARYL) 4 MG tablet Take 1 tablet by mouth Daily. 90 tablet 3   • glucose monitor monitoring kit 1 each Daily. 1 each 0   • hydrocortisone (ANUSOL-HC) 25 MG suppository Insert 1 suppository into the rectum 2 (Two) Times a Day. 20 suppository 11   • ibuprofen (ADVIL,MOTRIN) 800 MG tablet Take 1 tablet by mouth Every 8 (Eight) Hours As Needed for Mild Pain . 90 tablet 3   • metFORMIN (GLUCOPHAGE) 1000 MG tablet TAKE ONE TABLET BY MOUTH TWICE A DAY WITH MEALS 180 tablet 3   • omeprazole (priLOSEC) 40 MG capsule TAKE ONE CAPSULE BY MOUTH DAILY 90 capsule 2   • Xiidra 5 % ophthalmic solution      • [DISCONTINUED] atorvastatin (LIPITOR) 40 MG tablet Take 2 tablets by mouth Daily. 90 tablet 3   • [DISCONTINUED] benazepril (LOTENSIN) 40 MG tablet Take 1 tablet by mouth Daily. 90 tablet 3   • [DISCONTINUED] Dulaglutide (Trulicity) 1.5 MG/0.5ML solution pen-injector Inject 1.5 mg under the skin into the appropriate area as directed 1 (One) Time Per Week. 4 pen 11     No current facility-administered medications on file prior to visit.       Review of Systems   Constitutional: Negative.    Cardiovascular: Positive for chest pain.   Musculoskeletal: Positive for arthralgias.       Recent Results (from the past 4704 hour(s))   Hemoglobin A1c    Collection Time: 05/11/21  8:59 AM    Specimen: Blood   Result Value Ref Range    Hemoglobin A1C 7.50 (H)  "4.80 - 5.60 %   Comprehensive Metabolic Panel    Collection Time: 05/11/21  8:59 AM    Specimen: Blood   Result Value Ref Range    Glucose 176 (H) 65 - 99 mg/dL    BUN 12 8 - 23 mg/dL    Creatinine 0.62 0.57 - 1.00 mg/dL    eGFR Non African Am 98 >60 mL/min/1.73    eGFR African Am 119 >60 mL/min/1.73    BUN/Creatinine Ratio 19.4 7.0 - 25.0    Sodium 139 136 - 145 mmol/L    Potassium 4.4 3.5 - 5.2 mmol/L    Chloride 105 98 - 107 mmol/L    Total CO2 26.0 22.0 - 29.0 mmol/L    Calcium 10.9 (H) 8.6 - 10.5 mg/dL    Total Protein 7.4 6.0 - 8.5 g/dL    Albumin 4.50 3.50 - 5.20 g/dL    Globulin 2.9 gm/dL    A/G Ratio 1.6 g/dL    Total Bilirubin 0.2 0.0 - 1.2 mg/dL    Alkaline Phosphatase 89 39 - 117 U/L    AST (SGOT) 22 1 - 32 U/L    ALT (SGPT) 22 1 - 33 U/L   Lipid Panel With LDL / HDL Ratio    Collection Time: 05/11/21  8:59 AM    Specimen: Blood   Result Value Ref Range    Total Cholesterol 247 (H) 0 - 200 mg/dL    Triglycerides 212 (H) 0 - 150 mg/dL    HDL Cholesterol 51 40 - 60 mg/dL    VLDL Cholesterol Fred 39 5 - 40 mg/dL    LDL Chol Calc (NIH) 157 (H) 0 - 100 mg/dL    LDL/HDL RATIO 3.01      Objective   Vitals:    11/22/21 1318   BP: 134/80   BP Location: Left arm   Patient Position: Sitting   Pulse: 82   Temp: 98.2 °F (36.8 °C)   SpO2: 98%   Weight: 94 kg (207 lb 3.2 oz)   Height: 157.7 cm (62.09\")     Body mass index is 37.79 kg/m².    Physical Exam  Vitals and nursing note reviewed.   Constitutional:       General: She is not in acute distress.     Appearance: She is well-developed. She is obese. She is not diaphoretic.   Cardiovascular:      Rate and Rhythm: Normal rate and regular rhythm.   Pulmonary:      Effort: Pulmonary effort is normal. No respiratory distress.      Breath sounds: Normal breath sounds. No wheezing.   Musculoskeletal:         General: Tenderness present. No deformity.      Comments: Obese.  Soft tissue edema.  No appreciable deformity.           ECG 12 Lead    Date/Time: 11/22/2021 1:36 " PM  Performed by: Eufemia Brasher MD  Authorized by: Eufemia Brasher MD   Comparison: not compared with previous ECG   Rhythm: sinus rhythm    Clinical impression: normal ECG            Diagnoses and all orders for this visit:    1. Type 2 diabetes mellitus without complication, without long-term current use of insulin (HCC) (Primary)  -     Dulaglutide (Trulicity) 1.5 MG/0.5ML solution pen-injector; Inject 1.5 mg under the skin into the appropriate area as directed 1 (One) Time Per Week.  Dispense: 4 pen; Refill: 11  -     Hemoglobin A1c  -     Comprehensive Metabolic Panel  -     Lipid Panel With LDL / HDL Ratio    2. Primary hypertension  -     Comprehensive Metabolic Panel  -     Lipid Panel With LDL / HDL Ratio    3. Mixed hyperlipidemia  -     Comprehensive Metabolic Panel  -     Lipid Panel With LDL / HDL Ratio    4. Gastroesophageal reflux disease without esophagitis    5. Type 2 diabetes mellitus without complication, without long-term current use of insulin (HCC)  Comments:  much better control, A1c 7.5  Orders:  -     Dulaglutide (Trulicity) 1.5 MG/0.5ML solution pen-injector; Inject 1.5 mg under the skin into the appropriate area as directed 1 (One) Time Per Week.  Dispense: 4 pen; Refill: 11  -     Hemoglobin A1c  -     Comprehensive Metabolic Panel  -     Lipid Panel With LDL / HDL Ratio    6. Immunization refused    7. Essential hypertension  -     benazepril (LOTENSIN) 40 MG tablet; Take 1 tablet by mouth Daily.  Dispense: 90 tablet; Refill: 3    8. Hyperlipidemia, unspecified hyperlipidemia type  -     atorvastatin (LIPITOR) 40 MG tablet; Take 2 tablets by mouth Daily.  Dispense: 90 tablet; Refill: 3    9. Precordial pain  -     ECG 12 Lead  -     Stress test with myocardial perfusion; Future    10. Chronic pain of both knees  -     XR Knee 1 or 2 View Bilateral (In Office)  -     Ambulatory Referral to Orthopedic Surgery    11. Arthritis    12. Medically noncompliant      Return in about 3  months (around 2/22/2022) for DIABETES.    Patient was extensively counseled regarding coronavirus immunization and necessity of it.  However patient refuses at this time and is against coronavirus vaccination as well as other vaccinations.  Risks and benefits of the vaccination were discussed however patient is adamant and refuses all the vaccinations today.

## 2021-11-23 ENCOUNTER — TELEPHONE (OUTPATIENT)
Dept: FAMILY MEDICINE CLINIC | Facility: CLINIC | Age: 61
End: 2021-11-23

## 2021-11-23 DIAGNOSIS — M25.562 CHRONIC PAIN OF BOTH KNEES: Primary | ICD-10-CM

## 2021-11-23 DIAGNOSIS — M19.90 ARTHRITIS: ICD-10-CM

## 2021-11-23 DIAGNOSIS — M25.561 CHRONIC PAIN OF BOTH KNEES: Primary | ICD-10-CM

## 2021-11-23 DIAGNOSIS — G89.29 CHRONIC PAIN OF BOTH KNEES: Primary | ICD-10-CM

## 2021-11-23 LAB
ALBUMIN SERPL-MCNC: 4.8 G/DL (ref 3.8–4.8)
ALBUMIN/GLOB SERPL: 1.6 {RATIO} (ref 1.2–2.2)
ALP SERPL-CCNC: 110 IU/L (ref 44–121)
ALT SERPL-CCNC: 31 IU/L (ref 0–32)
AST SERPL-CCNC: 37 IU/L (ref 0–40)
BILIRUB SERPL-MCNC: 0.5 MG/DL (ref 0–1.2)
BUN SERPL-MCNC: 10 MG/DL (ref 8–27)
BUN/CREAT SERPL: 15 (ref 12–28)
CALCIUM SERPL-MCNC: 11.1 MG/DL (ref 8.7–10.3)
CHLORIDE SERPL-SCNC: 100 MMOL/L (ref 96–106)
CHOLEST SERPL-MCNC: 271 MG/DL (ref 100–199)
CO2 SERPL-SCNC: 27 MMOL/L (ref 20–29)
CREAT SERPL-MCNC: 0.68 MG/DL (ref 0.57–1)
GLOBULIN SER CALC-MCNC: 3 G/DL (ref 1.5–4.5)
GLUCOSE SERPL-MCNC: 180 MG/DL (ref 65–99)
HBA1C MFR BLD: 9.8 % (ref 4.8–5.6)
HDLC SERPL-MCNC: 48 MG/DL
LDLC SERPL CALC-MCNC: 177 MG/DL (ref 0–99)
LDLC/HDLC SERPL: 3.7 RATIO (ref 0–3.2)
POTASSIUM SERPL-SCNC: 4.7 MMOL/L (ref 3.5–5.2)
PROT SERPL-MCNC: 7.8 G/DL (ref 6–8.5)
SODIUM SERPL-SCNC: 140 MMOL/L (ref 134–144)
TRIGL SERPL-MCNC: 245 MG/DL (ref 0–149)
VLDLC SERPL CALC-MCNC: 46 MG/DL (ref 5–40)

## 2021-11-23 NOTE — TELEPHONE ENCOUNTER
Pt inquiring if lab results and xray have come back.    -Advised pt results are available but have not been read by provider and would likely be able to given to pt next week.    -Pt would like follow up once results are available    [phone]

## 2021-11-29 ENCOUNTER — TELEPHONE (OUTPATIENT)
Dept: FAMILY MEDICINE CLINIC | Facility: CLINIC | Age: 61
End: 2021-11-29

## 2021-11-30 ENCOUNTER — OFFICE VISIT (OUTPATIENT)
Dept: FAMILY MEDICINE CLINIC | Facility: CLINIC | Age: 61
End: 2021-11-30

## 2021-11-30 VITALS
WEIGHT: 209.2 LBS | SYSTOLIC BLOOD PRESSURE: 132 MMHG | OXYGEN SATURATION: 97 % | HEART RATE: 81 BPM | HEIGHT: 62 IN | RESPIRATION RATE: 18 BRPM | TEMPERATURE: 97.1 F | BODY MASS INDEX: 38.5 KG/M2 | DIASTOLIC BLOOD PRESSURE: 80 MMHG

## 2021-11-30 DIAGNOSIS — Z23 IMMUNIZATION DUE: Primary | ICD-10-CM

## 2021-11-30 DIAGNOSIS — E11.9 TYPE 2 DIABETES MELLITUS WITHOUT COMPLICATION, WITHOUT LONG-TERM CURRENT USE OF INSULIN (HCC): ICD-10-CM

## 2021-11-30 PROCEDURE — 91300 COVID-19 (PFIZER): CPT | Performed by: FAMILY MEDICINE

## 2021-11-30 PROCEDURE — 0001A COVID-19 (PFIZER): CPT | Performed by: FAMILY MEDICINE

## 2021-11-30 PROCEDURE — 99214 OFFICE O/P EST MOD 30 MIN: CPT | Performed by: FAMILY MEDICINE

## 2021-11-30 RX ORDER — DULAGLUTIDE 1.5 MG/.5ML
1.5 INJECTION, SOLUTION SUBCUTANEOUS WEEKLY
Qty: 12 PEN | Refills: 11 | Status: SHIPPED | OUTPATIENT
Start: 2021-11-30 | End: 2022-07-06

## 2021-11-30 NOTE — PROGRESS NOTES
Cristino Lubin is a 61 y.o. female.     Chief Complaint   Patient presents with   • Diabetes       History of Present Illness     Follow-up on diabetes today.  Call patient urgently after her last A1c came back at 9.8.  Which is much worse than 7.5  5 months ago.    She apparently was not taking Trulicity due to travel.  And that increase her blood sugar significantly.  Extensive counseling was provided regarding Covid immunization patient finally agreed to get the coronavirus shot #1 today.  Extensive counseling provided for weight loss as well as diet.  And regular medication use  The following portions of the patient's history were reviewed and updated as appropriate: allergies, current medications, past family history, past medical history, past social history, past surgical history and problem list.    Past Medical History:   Diagnosis Date   • Diabetes mellitus (HCC)    • GERD (gastroesophageal reflux disease)    • Hyperlipidemia    • Hypertension        History reviewed. No pertinent surgical history.    Family History   Problem Relation Age of Onset   • Heart disease Mother        Social History     Socioeconomic History   • Marital status:    Tobacco Use   • Smoking status: Never Smoker   • Smokeless tobacco: Never Used   Vaping Use   • Vaping Use: Never used   Substance and Sexual Activity   • Alcohol use: Defer   • Drug use: Defer   • Sexual activity: Defer       Current Outpatient Medications on File Prior to Visit   Medication Sig Dispense Refill   • Accu-Chek Ruby Plus test strip 1 each by Other route Daily. 100 each 11   • acyclovir (ZOVIRAX) 400 MG tablet      • Aspirin Low Dose 81 MG EC tablet TAKE ONE TABLET BY MOUTH DAILY 90 tablet 3   • atorvastatin (LIPITOR) 40 MG tablet Take 2 tablets by mouth Daily. 90 tablet 3   • benazepril (LOTENSIN) 40 MG tablet Take 1 tablet by mouth Daily. 90 tablet 3   • bisacodyl (Dulcolax) 10 MG suppository Insert 1 suppository into the rectum  Daily. 30 suppository 11   • Blood Glucose Monitoring Suppl (Accu-Chek Ruby Plus) w/Device kit 1 each by Other route Daily. 1 kit 0   • clobetasol (TEMOVATE) 0.05 % ointment APPLY TO AFFECTED AREA(S) TWO TIMES A  each 5   • diclofenac (VOLTAREN) 1 % gel gel Apply  topically to the appropriate area as directed 4 (Four) Times a Day. 3 tube 6   • Diclofenac Sodium (VOLTAREN) 1 % gel gel      • Easy Touch Safety Lancets 28G misc Inject 1 each under the skin into the appropriate area as directed 2 (Two) Times a Day. 100 each 6   • glimepiride (AMARYL) 4 MG tablet Take 1 tablet by mouth Daily. 90 tablet 3   • glucose monitor monitoring kit 1 each Daily. 1 each 0   • hydrocortisone (ANUSOL-HC) 25 MG suppository Insert 1 suppository into the rectum 2 (Two) Times a Day. 20 suppository 11   • ibuprofen (ADVIL,MOTRIN) 800 MG tablet Take 1 tablet by mouth Every 8 (Eight) Hours As Needed for Mild Pain . 90 tablet 3   • metFORMIN (GLUCOPHAGE) 1000 MG tablet TAKE ONE TABLET BY MOUTH TWICE A DAY WITH MEALS 180 tablet 3   • omeprazole (priLOSEC) 40 MG capsule TAKE ONE CAPSULE BY MOUTH DAILY 90 capsule 2   • Xiidra 5 % ophthalmic solution      • [DISCONTINUED] Dulaglutide (Trulicity) 1.5 MG/0.5ML solution pen-injector Inject 1.5 mg under the skin into the appropriate area as directed 1 (One) Time Per Week. 4 pen 11     No current facility-administered medications on file prior to visit.       Review of Systems   Constitutional: Negative.        Recent Results (from the past 4704 hour(s))   Hemoglobin A1c    Collection Time: 11/22/21  2:30 PM    Specimen: Blood   Result Value Ref Range    Hemoglobin A1C 9.8 (H) 4.8 - 5.6 %   Comprehensive Metabolic Panel    Collection Time: 11/22/21  2:30 PM    Specimen: Blood   Result Value Ref Range    Glucose 180 (H) 65 - 99 mg/dL    BUN 10 8 - 27 mg/dL    Creatinine 0.68 0.57 - 1.00 mg/dL    eGFR Non African Am 95 >59 mL/min/1.73    eGFR African Am 109 >59 mL/min/1.73    BUN/Creatinine  "Ratio 15 12 - 28    Sodium 140 134 - 144 mmol/L    Potassium 4.7 3.5 - 5.2 mmol/L    Chloride 100 96 - 106 mmol/L    Total CO2 27 20 - 29 mmol/L    Calcium 11.1 (H) 8.7 - 10.3 mg/dL    Total Protein 7.8 6.0 - 8.5 g/dL    Albumin 4.8 3.8 - 4.8 g/dL    Globulin 3.0 1.5 - 4.5 g/dL    A/G Ratio 1.6 1.2 - 2.2    Total Bilirubin 0.5 0.0 - 1.2 mg/dL    Alkaline Phosphatase 110 44 - 121 IU/L    AST (SGOT) 37 0 - 40 IU/L    ALT (SGPT) 31 0 - 32 IU/L   Lipid Panel With LDL / HDL Ratio    Collection Time: 11/22/21  2:30 PM    Specimen: Blood   Result Value Ref Range    Total Cholesterol 271 (H) 100 - 199 mg/dL    Triglycerides 245 (H) 0 - 149 mg/dL    HDL Cholesterol 48 >39 mg/dL    VLDL Cholesterol Fred 46 (H) 5 - 40 mg/dL    LDL Chol Calc (NIH) 177 (H) 0 - 99 mg/dL    LDL/HDL RATIO 3.7 (H) 0.0 - 3.2 ratio     Objective   Vitals:    11/30/21 0857   BP: 132/80   Pulse: 81   Resp: 18   Temp: 97.1 °F (36.2 °C)   TempSrc: Infrared   SpO2: 97%   Weight: 94.9 kg (209 lb 3.2 oz)   Height: 157.5 cm (62\")     Body mass index is 38.26 kg/m².  Physical Exam  Vitals and nursing note reviewed.   Constitutional:       General: She is not in acute distress.     Appearance: She is well-developed. She is not diaphoretic.   Cardiovascular:      Rate and Rhythm: Normal rate and regular rhythm.   Pulmonary:      Effort: Pulmonary effort is normal. No respiratory distress.      Breath sounds: Normal breath sounds. No wheezing.           Diagnoses and all orders for this visit:    1. Immunization due (Primary)  -     COVID-19 Vaccine ([employer])    2. Type 2 diabetes mellitus without complication, without long-term current use of insulin ([facility])  Comments:  much better control, A1c 7.5  Orders:  -     Dulaglutide (Trulicity) 1.5 MG/0.5ML solution pen-injector; Inject 1.5 mg under the skin into the appropriate area as directed 1 (One) Time Per Week.  Dispense: 12 pen; Refill: 11      Return in about 3 months (around 2/28/2022) for DIABETES.      .      "

## 2021-12-02 ENCOUNTER — TELEPHONE (OUTPATIENT)
Dept: ORTHOPEDIC SURGERY | Facility: CLINIC | Age: 61
End: 2021-12-02

## 2021-12-02 NOTE — TELEPHONE ENCOUNTER
Caller: ANUPAMA BRYAN    Relationship to patient: SELF    Best call back number: [phone]    Patient is needing: PATIENT WILL HAVE A A [place]  FOR HER APPOINTMENT.  HER DAUGHTER MARIZOL WILL BE JOINING HER FOR THE APPOINTMENT AND BE ABLE TO TRANSLATE FOR HER. APPT IS ON 12-.

## 2021-12-05 DIAGNOSIS — E11.9 TYPE 2 DIABETES MELLITUS WITHOUT COMPLICATION, WITHOUT LONG-TERM CURRENT USE OF INSULIN (HCC): ICD-10-CM

## 2021-12-06 RX ORDER — GLIMEPIRIDE 4 MG/1
TABLET ORAL
Qty: 90 TABLET | Refills: 3 | Status: SHIPPED | OUTPATIENT
Start: 2021-12-06 | End: 2022-12-14

## 2021-12-13 ENCOUNTER — OFFICE VISIT (OUTPATIENT)
Dept: ORTHOPEDIC SURGERY | Facility: CLINIC | Age: 61
End: 2021-12-13

## 2021-12-13 VITALS — HEIGHT: 62 IN | BODY MASS INDEX: 38.46 KG/M2 | TEMPERATURE: 98.2 F | WEIGHT: 209 LBS

## 2021-12-13 DIAGNOSIS — M17.12 ARTHRITIS OF LEFT KNEE: Primary | ICD-10-CM

## 2021-12-13 DIAGNOSIS — M17.11 ARTHRITIS OF RIGHT KNEE: ICD-10-CM

## 2021-12-13 PROCEDURE — 20610 DRAIN/INJ JOINT/BURSA W/O US: CPT | Performed by: ORTHOPAEDIC SURGERY

## 2021-12-13 PROCEDURE — 99204 OFFICE O/P NEW MOD 45 MIN: CPT | Performed by: ORTHOPAEDIC SURGERY

## 2021-12-13 RX ORDER — TRIAMCINOLONE ACETONIDE 40 MG/ML
80 INJECTION, SUSPENSION INTRA-ARTICULAR; INTRAMUSCULAR
Status: COMPLETED | OUTPATIENT
Start: 2021-12-13 | End: 2021-12-13

## 2021-12-13 RX ADMIN — TRIAMCINOLONE ACETONIDE 80 MG: 40 INJECTION, SUSPENSION INTRA-ARTICULAR; INTRAMUSCULAR at 10:58

## 2021-12-13 NOTE — PROGRESS NOTES
Patient Name: Queenie Lubin   YOB: 1960  Referring Primary Care Physician: Eufemia Brasher MD  BMI: Body mass index is 38.22 kg/m².    Chief Complaint:    Chief Complaint   Patient presents with   • Left Knee - Initial Evaluation   • Right Knee - Initial Evaluation        HPI:     Queenie Lubin is a 61 y.o. female who presents today for evaluation of   Chief Complaint   Patient presents with   • Left Knee - Initial Evaluation   • Right Knee - Initial Evaluation   .  Patient is seen today complaining of left more than right knee pain.  Her daughter works as a  at [employer] with her but she also is a  with her she is from [place].  Has had relatively long-term history going back to 2004 2005 she takes Tylenol for it talking with the daughter it seems like they did not want her take other anti-inflammatories as she does have some GI history diabetes and the like.  She said heat does help and to get stiff      Subjective   Medications:   Home Medications:  Current Outpatient Medications on File Prior to Visit   Medication Sig   • Accu-Chek Ruby Plus test strip 1 each by Other route Daily.   • acyclovir (ZOVIRAX) 400 MG tablet    • Aspirin Low Dose 81 MG EC tablet TAKE ONE TABLET BY MOUTH DAILY   • atorvastatin (LIPITOR) 40 MG tablet Take 2 tablets by mouth Daily.   • benazepril (LOTENSIN) 40 MG tablet Take 1 tablet by mouth Daily.   • bisacodyl (Dulcolax) 10 MG suppository Insert 1 suppository into the rectum Daily.   • Blood Glucose Monitoring Suppl (Accu-Chek Ruby Plus) w/Device kit 1 each by Other route Daily.   • clobetasol (TEMOVATE) 0.05 % ointment APPLY TO AFFECTED AREA(S) TWO TIMES A DAY   • diclofenac (VOLTAREN) 1 % gel gel Apply  topically to the appropriate area as directed 4 (Four) Times a Day.   • Diclofenac Sodium (VOLTAREN) 1 % gel gel    • Dulaglutide (Trulicity) 1.5 MG/0.5ML solution pen-injector Inject 1.5 mg under the skin into the  appropriate area as directed 1 (One) Time Per Week.   • Easy Touch Safety Lancets 28G misc Inject 1 each under the skin into the appropriate area as directed 2 (Two) Times a Day.   • glimepiride (AMARYL) 4 MG tablet TAKE ONE TABLET BY MOUTH DAILY   • glucose monitor monitoring kit 1 each Daily.   • hydrocortisone (ANUSOL-HC) 25 MG suppository Insert 1 suppository into the rectum 2 (Two) Times a Day.   • ibuprofen (ADVIL,MOTRIN) 800 MG tablet Take 1 tablet by mouth Every 8 (Eight) Hours As Needed for Mild Pain .   • metFORMIN (GLUCOPHAGE) 1000 MG tablet TAKE ONE TABLET BY MOUTH TWICE A DAY WITH MEALS   • omeprazole (priLOSEC) 40 MG capsule TAKE ONE CAPSULE BY MOUTH DAILY   • Xiidra 5 % ophthalmic solution      No current facility-administered medications on file prior to visit.     Current Medications:  Scheduled Meds:  Continuous Infusions:No current facility-administered medications for this visit.    PRN Meds:.    I have reviewed the patient's medical history in detail and updated the computerized patient record.  Review and summarization of old records includes:    Past Medical History:   Diagnosis Date   • Diabetes mellitus (HCC)    • GERD (gastroesophageal reflux disease)    • Hyperlipidemia    • Hypertension       History reviewed. No pertinent surgical history.     Social History     Occupational History   • Not on file   Tobacco Use   • Smoking status: Never Smoker   • Smokeless tobacco: Never Used   Vaping Use   • Vaping Use: Never used   Substance and Sexual Activity   • Alcohol use: Defer   • Drug use: Defer   • Sexual activity: Defer      Social History     Social History Narrative   • Not on file        Family History   Problem Relation Age of Onset   • Heart disease Mother        ROS: 14 point review of systems was performed and all other systems were reviewed and are negative except for documented findings in HPI and today's encounter.     Allergies: No Known Allergies  Constitutional:  Denies fever,  "shaking or chills   Eyes:  Denies change in visual acuity   HENT:  Denies nasal congestion or sore throat   Respiratory:  Denies cough or shortness of breath   Cardiovascular:  Denies chest pain or severe LE edema   GI:  Denies abdominal pain, nausea, vomiting, bloody stools or diarrhea   Musculoskeletal:  Numbness, tingling, pain, or loss of motor function only as noted above in history of present illness.  : Denies painful urination or hematuria  Integument:  Denies rash, lesion or ulceration   Neurologic:  Denies headache or focal weakness  Endocrine:  Denies lymphadenopathy  Psych:  Denies confusion or change in mental status   Hem:  Denies active bleeding    OBJECTIVE:  Physical Exam: 61 y.o. female  Wt Readings from Last 3 Encounters:   12/13/21 94.8 kg (209 lb)   11/30/21 94.9 kg (209 lb 3.2 oz)   11/22/21 94 kg (207 lb 3.2 oz)     Ht Readings from Last 1 Encounters:   12/13/21 157.5 cm (62.01\")     Body mass index is 38.22 kg/m².  Vitals:    12/13/21 1026   Temp: 98.2 °F (36.8 °C)     Vital signs reviewed.     General Appearance:    Alert, cooperative, in no acute distress                  Eyes: conjunctiva clear  ENT: external ears and nose atraumatic  CV: no peripheral edema  Resp: normal respiratory effort  Skin: no rashes or wounds; normal turgor  Psych: mood and affect appropriate  Lymph: no nodes appreciated  Neuro: gross sensation intact  Vascular:  Palpable peripheral pulse in noted extremity  Musculoskeletal Extremities: Examination today shows crepitation synovitis swelling both knees.  She has what may be psoriasis on the front of her knees bilaterally her hips are nontender to axial loading or rotation he does have some pseudolaxity and some joint line tenderness    Radiology:   AP lateral x-ray reports reviewed.  They do not have the actual films that were done in the doctor's office but it shows advanced arthritic change which matches her exam I see no need to repeat x-rays so quickly with " above findings and explained this to them.        Assessment:     ICD-10-CM ICD-9-CM   1. Arthritis of left knee  M17.12 716.96   2. Arthritis of right knee  M17.11 716.96        MDM/Plan:   The diagnosis(es), natural history, pathophysiology and treatment for diagnosis(es) were discussed. Opportunity given and questions answered.  Biomechanics of pertinent body areas discussed.  When appropriate, the use of ambulatory aids discussed.    MEDICATIONS:  The risks, benefits, warnings,side effects and alternatives of medications discussed.  Inflammation/pain control; with cold, heat, elevation and/or liniments discussed as appropriate  Cortisone Injection. See procedure note.  MEDICAL RECORDS reviewed from other provider(s) for past and current medical history pertinent to this complaint.    Large Joint Arthrocentesis: L knee  Date/Time: 12/13/2021 10:58 AM  Consent given by: patient  Site marked: site marked  Timeout: Immediately prior to procedure a time out was called to verify the correct patient, procedure, equipment, support staff and site/side marked as required   Supporting Documentation  Indications: pain and joint swelling   Procedure Details  Location: knee - L knee  Preparation: Patient was prepped and draped in the usual sterile fashion  Needle size: 22 G  Approach: anterolateral  Medications administered: 4 mL lidocaine (cardiac); 80 mg triamcinolone acetonide 40 MG/ML  Patient tolerance: patient tolerated the procedure well with no immediate complications            12/13/2021    Dictated utilizing Dragon dictation

## 2021-12-17 DIAGNOSIS — K21.9 GASTROESOPHAGEAL REFLUX DISEASE WITHOUT ESOPHAGITIS: ICD-10-CM

## 2021-12-20 RX ORDER — OMEPRAZOLE 40 MG/1
CAPSULE, DELAYED RELEASE ORAL
Qty: 90 CAPSULE | Refills: 2 | Status: SHIPPED | OUTPATIENT
Start: 2021-12-20

## 2021-12-20 NOTE — TELEPHONE ENCOUNTER
Rx Refill Note  Requested Prescriptions     Pending Prescriptions Disp Refills   • omeprazole (priLOSEC) 40 MG capsule [Pharmacy Med Name: OMEPRAZOLE DR 40 MG CAPSULE] 90 capsule 2     Sig: TAKE ONE CAPSULE BY MOUTH DAILY      Last office visit with prescribing clinician: 11/30/2021      Next office visit with prescribing clinician: 1/24/2022   Last filled 6/24/2021           Oj Douglas MA  12/20/21, 14:28 EST

## 2021-12-26 DIAGNOSIS — R21 RASH: ICD-10-CM

## 2021-12-26 RX ORDER — CLOBETASOL PROPIONATE 0.5 MG/G
OINTMENT TOPICAL
Qty: 60 G | Refills: 0 | Status: SHIPPED | OUTPATIENT
Start: 2021-12-26 | End: 2022-02-21

## 2022-01-20 DIAGNOSIS — M19.90 ARTHRITIS: ICD-10-CM

## 2022-01-20 RX ORDER — IBUPROFEN 800 MG/1
TABLET ORAL
Qty: 90 TABLET | Refills: 3 | Status: SHIPPED | OUTPATIENT
Start: 2022-01-20 | End: 2022-10-14

## 2022-01-20 NOTE — TELEPHONE ENCOUNTER
Rx Refill Note  Requested Prescriptions     Pending Prescriptions Disp Refills   • ibuprofen (ADVIL,MOTRIN) 800 MG tablet [Pharmacy Med Name: IBUPROFEN 800 MG TABLET] 90 tablet 3     Sig: TAKE ONE TABLET BY MOUTH EVERY 8 HOURS AS NEEDED FOR MILD PAIN      Last office visit with prescribing clinician: 11/30/2021      Next office visit with prescribing clinician: 1/24/2022   Last filled 2/2/2021           Oj Douglas MA  01/20/22, 14:39 EST

## 2022-01-28 ENCOUNTER — TELEPHONE (OUTPATIENT)
Dept: FAMILY MEDICINE CLINIC | Facility: CLINIC | Age: 62
End: 2022-01-28

## 2022-01-28 DIAGNOSIS — E11.9 TYPE 2 DIABETES MELLITUS WITHOUT COMPLICATION, WITHOUT LONG-TERM CURRENT USE OF INSULIN: ICD-10-CM

## 2022-01-28 RX ORDER — BLOOD-GLUCOSE METER
1 EACH MISCELLANEOUS DAILY
Qty: 1 KIT | Refills: 0 | Status: SHIPPED | OUTPATIENT
Start: 2022-01-28 | End: 2022-02-01 | Stop reason: CLARIF

## 2022-01-28 RX ORDER — BLOOD SUGAR DIAGNOSTIC
1 STRIP MISCELLANEOUS DAILY
Qty: 100 EACH | Refills: 11 | Status: SHIPPED | OUTPATIENT
Start: 2022-01-28 | End: 2022-02-01 | Stop reason: CLARIF

## 2022-01-28 RX ORDER — BLOOD-GLUCOSE METER
1 KIT MISCELLANEOUS DAILY
Qty: 1 EACH | Refills: 0 | Status: SHIPPED | OUTPATIENT
Start: 2022-01-28

## 2022-01-28 NOTE — TELEPHONE ENCOUNTER
PATIENT IS IN NEED OF A NEW GLUCOMETER AND SUPPLIES. HERS IS NO LONGER WORKING. SHE IS IN NEED OF THIS ASAP    PLEASE ADVISE  [phone]     [facility] [address] AT [facility] [phone] [facility] [phone]   [phone]

## 2022-02-01 DIAGNOSIS — E11.9 TYPE 2 DIABETES MELLITUS WITHOUT COMPLICATION, WITHOUT LONG-TERM CURRENT USE OF INSULIN: Primary | ICD-10-CM

## 2022-02-01 RX ORDER — BLOOD SUGAR DIAGNOSTIC
STRIP MISCELLANEOUS
Qty: 200 EACH | Refills: 12 | Status: SHIPPED | OUTPATIENT
Start: 2022-02-01

## 2022-02-01 RX ORDER — LANCETS 28 GAUGE
1 EACH MISCELLANEOUS 2 TIMES DAILY
Qty: 200 EACH | Refills: 6 | Status: SHIPPED | OUTPATIENT
Start: 2022-02-01

## 2022-02-01 RX ORDER — BLOOD-GLUCOSE METER
1 EACH MISCELLANEOUS 2 TIMES DAILY
Qty: 1 KIT | Refills: 0 | Status: SHIPPED | OUTPATIENT
Start: 2022-02-01

## 2022-02-01 NOTE — TELEPHONE ENCOUNTER
[facility]. Refill sent.

## 2022-02-18 DIAGNOSIS — R21 RASH: ICD-10-CM

## 2022-02-21 RX ORDER — CLOBETASOL PROPIONATE 0.5 MG/G
OINTMENT TOPICAL
Qty: 60 G | Refills: 0 | Status: SHIPPED | OUTPATIENT
Start: 2022-02-21 | End: 2022-05-16

## 2022-02-21 NOTE — TELEPHONE ENCOUNTER
Rx Refill Note  Requested Prescriptions     Pending Prescriptions Disp Refills   • clobetasol (TEMOVATE) 0.05 % ointment [Pharmacy Med Name: CLOBETASOL 0.05% OINTMENT] 60 g 0     Sig: APPLY TO AFFECTED AREA(S) TWO TIMES A DAY      Last office visit with prescribing clinician: 11/30/2021      Next office visit with prescribing clinician: Visit date not found            Anival Dawkins MA  02/21/22, 08:22 EST

## 2022-05-16 DIAGNOSIS — R21 RASH: ICD-10-CM

## 2022-05-16 RX ORDER — CLOBETASOL PROPIONATE 0.5 MG/G
OINTMENT TOPICAL
Qty: 60 G | Refills: 0 | Status: SHIPPED | OUTPATIENT
Start: 2022-05-16

## 2022-05-16 NOTE — TELEPHONE ENCOUNTER
Rx Refill Note  Requested Prescriptions     Pending Prescriptions Disp Refills   • clobetasol (TEMOVATE) 0.05 % ointment [Pharmacy Med Name: CLOBETASOL 0.05% OINTMENT] 60 g 0     Sig: APPLY TO AFFECTED AREA(S) TWO TIMES A DAY   • Diclofenac Sodium (VOLTAREN) 1 % gel gel [Pharmacy Med Name: DICLOFENAC SODIUM 1% GEL] 100 g 0     Sig: APPLY TWO GRAMS TOPICALLY TO AFFECTED AREA FOUR TIMES A DAY AS DIRECTED      Last office visit with prescribing clinician: 11/30/2021      Next office visit with prescribing clinician: 6/28/2022  Last filled 2/21/2022           Oj Douglas MA  05/16/22, 16:19 EDT

## 2022-06-28 ENCOUNTER — OFFICE VISIT (OUTPATIENT)
Dept: FAMILY MEDICINE CLINIC | Facility: CLINIC | Age: 62
End: 2022-06-28

## 2022-06-28 VITALS
TEMPERATURE: 97.7 F | DIASTOLIC BLOOD PRESSURE: 77 MMHG | SYSTOLIC BLOOD PRESSURE: 124 MMHG | OXYGEN SATURATION: 98 % | HEART RATE: 77 BPM | WEIGHT: 196.4 LBS | BODY MASS INDEX: 35.91 KG/M2

## 2022-06-28 DIAGNOSIS — Z00.00 WELL FEMALE EXAM WITHOUT GYNECOLOGICAL EXAM: Primary | ICD-10-CM

## 2022-06-28 DIAGNOSIS — E11.9 TYPE 2 DIABETES MELLITUS WITHOUT COMPLICATION, WITHOUT LONG-TERM CURRENT USE OF INSULIN: ICD-10-CM

## 2022-06-28 DIAGNOSIS — E83.52 HYPERCALCEMIA: ICD-10-CM

## 2022-06-28 DIAGNOSIS — E78.2 MIXED HYPERLIPIDEMIA: ICD-10-CM

## 2022-06-28 PROCEDURE — 99396 PREV VISIT EST AGE 40-64: CPT | Performed by: NURSE PRACTITIONER

## 2022-06-28 RX ORDER — PREDNISOLONE ACETATE 10 MG/ML
SUSPENSION/ DROPS OPHTHALMIC
COMMUNITY
Start: 2022-05-16

## 2022-06-28 NOTE — PROGRESS NOTES
Subjective   Queenie Lubin is a 62 y.o. female.     Chief Complaint   Patient presents with   • Diabetes     History obtained from patient and daughter.  History of Present Illness   The patient is being seen for a health maintenance evaluation.  Social history: Household members include daughter.  She is .  Work status: Not currently employed.  The patient has never smoked cigarettes.  She reports no alcohol use.  General health: The patient's health is described as good.  She does not have regular dental visits.  The patient has full dentures.  She denies vision problems.  Vision care includes glasses and an eye exam within the past year.  She denies hearing loss.  Immunization status: Pneumococcal and COVID vaccinations needed.  Lifestyle: She does not exercise regularly.  Reproductive health: She is not sexually active.  Screening: A Cologuard was performed on 11/6/2019.    The following portions of the patient's history were reviewed and updated as appropriate: allergies, current medications, past family history, past medical history, past social history, past surgical history and problem list.    Past Medical History:   Diagnosis Date   • Diabetes mellitus (HCC)    • GERD (gastroesophageal reflux disease)    • Hyperlipidemia    • Hypertension        History reviewed. No pertinent surgical history.    Family History   Problem Relation Age of Onset   • Heart disease Mother        Social History     Socioeconomic History   • Marital status:    Tobacco Use   • Smoking status: Never Smoker   • Smokeless tobacco: Never Used   Vaping Use   • Vaping Use: Never used   Substance and Sexual Activity   • Alcohol use: Defer   • Drug use: Defer   • Sexual activity: Defer       Review of Systems   Constitutional: Negative for fever.   HENT: Negative for ear pain, rhinorrhea and sore throat.    Eyes: Negative for visual disturbance.   Respiratory: Negative for cough and shortness of breath.     Cardiovascular: Negative for chest pain.   Gastrointestinal: Negative for abdominal pain, diarrhea, nausea and vomiting.   Genitourinary: Negative.    Musculoskeletal: Negative.    Skin: Negative for rash.   Neurological: Negative for dizziness and headache.   Psychiatric/Behavioral: Negative for depressed mood.       Objective   Vitals:    06/28/22 1010   BP: 124/77   BP Location: Left arm   Patient Position: Sitting   Cuff Size: Large Adult   Pulse: 77   Temp: 97.7 °F (36.5 °C)   TempSrc: Temporal   SpO2: 98%   Weight: 89.1 kg (196 lb 6.4 oz)      Body mass index is 35.91 kg/m².  Physical Exam  Vitals and nursing note reviewed.   Constitutional:       Appearance: Normal appearance.   HENT:      Head: Normocephalic and atraumatic.      Right Ear: Tympanic membrane and ear canal normal.      Left Ear: Tympanic membrane and ear canal normal.   Eyes:      Conjunctiva/sclera: Conjunctivae normal.      Pupils: Pupils are equal, round, and reactive to light.   Cardiovascular:      Rate and Rhythm: Normal rate and regular rhythm.      Heart sounds: Normal heart sounds.   Pulmonary:      Effort: Pulmonary effort is normal.      Breath sounds: Normal breath sounds.   Abdominal:      General: Bowel sounds are normal.      Palpations: Abdomen is soft.      Tenderness: There is no abdominal tenderness.   Genitourinary:     Comments: Declined   Musculoskeletal:         General: Normal range of motion.      Cervical back: Neck supple.   Skin:     General: Skin is warm and dry.   Neurological:      Mental Status: She is alert and oriented to person, place, and time.   Psychiatric:         Mood and Affect: Mood normal.           Assessment & Plan   Diagnoses and all orders for this visit:    1. Well female exam without gynecological exam (Primary)    2. Type 2 diabetes mellitus without complication, without long-term current use of insulin ([facility])  -     Hemoglobin A1c  -     Microalbumin / Creatinine Urine Ratio - Urine, Clean  Catch  -     Comprehensive Metabolic Panel    3. Mixed hyperlipidemia  -     Lipid Panel With / Chol / HDL Ratio  -     Comprehensive Metabolic Panel    4. Hypercalcemia  -     Comprehensive Metabolic Panel  -     PTH, Intact  -     TSH Rfx On Abnormal To Free T4  -     Vitamin D 25 Hydroxy    Impression: Currently, she has an inadequate exercise regimen.  Colorectal cancer screening is current.  Screening lab work includes A1c, urine microalbumin, CMP, lipid panel, PTH, thyroid function and vitamin D.  Patient declines vaccinations at this time.  Advice and education were given regarding aerobic exercise.

## 2022-06-29 LAB
25(OH)D3+25(OH)D2 SERPL-MCNC: 13.7 NG/ML (ref 30–100)
ALBUMIN SERPL-MCNC: 4.6 G/DL (ref 3.8–4.8)
ALBUMIN/CREAT UR: 26 MG/G CREAT (ref 0–29)
ALBUMIN/GLOB SERPL: 1.5 {RATIO} (ref 1.2–2.2)
ALP SERPL-CCNC: 102 IU/L (ref 44–121)
ALT SERPL-CCNC: 24 IU/L (ref 0–32)
AST SERPL-CCNC: 21 IU/L (ref 0–40)
BILIRUB SERPL-MCNC: 0.4 MG/DL (ref 0–1.2)
BUN SERPL-MCNC: 9 MG/DL (ref 8–27)
BUN/CREAT SERPL: 15 (ref 12–28)
CALCIUM SERPL-MCNC: 10.9 MG/DL (ref 8.7–10.3)
CHLORIDE SERPL-SCNC: 98 MMOL/L (ref 96–106)
CHOLEST SERPL-MCNC: 268 MG/DL (ref 100–199)
CHOLEST/HDLC SERPL: 5.5 RATIO (ref 0–4.4)
CO2 SERPL-SCNC: 27 MMOL/L (ref 20–29)
CREAT SERPL-MCNC: 0.61 MG/DL (ref 0.57–1)
CREAT UR-MCNC: 109.3 MG/DL
EGFRCR SERPLBLD CKD-EPI 2021: 101 ML/MIN/1.73
GLOBULIN SER CALC-MCNC: 3.1 G/DL (ref 1.5–4.5)
GLUCOSE SERPL-MCNC: 278 MG/DL (ref 65–99)
HBA1C MFR BLD: 12.5 % (ref 4.8–5.6)
HDLC SERPL-MCNC: 49 MG/DL
LDLC SERPL CALC-MCNC: 165 MG/DL (ref 0–99)
MICROALBUMIN UR-MCNC: 28.9 UG/ML
POTASSIUM SERPL-SCNC: 5.1 MMOL/L (ref 3.5–5.2)
PROT SERPL-MCNC: 7.7 G/DL (ref 6–8.5)
PTH-INTACT SERPL-MCNC: 44 PG/ML (ref 15–65)
SODIUM SERPL-SCNC: 137 MMOL/L (ref 134–144)
TRIGL SERPL-MCNC: 285 MG/DL (ref 0–149)
TSH SERPL DL<=0.005 MIU/L-ACNC: 1.04 UIU/ML (ref 0.45–4.5)
VLDLC SERPL CALC-MCNC: 54 MG/DL (ref 5–40)

## 2022-06-30 ENCOUNTER — TELEPHONE (OUTPATIENT)
Dept: FAMILY MEDICINE CLINIC | Facility: CLINIC | Age: 62
End: 2022-06-30

## 2022-06-30 DIAGNOSIS — E11.9 TYPE 2 DIABETES MELLITUS WITHOUT COMPLICATION, WITHOUT LONG-TERM CURRENT USE OF INSULIN: ICD-10-CM

## 2022-07-06 RX ORDER — ROSUVASTATIN CALCIUM 40 MG/1
40 TABLET, COATED ORAL DAILY
Qty: 90 TABLET | Refills: 1 | Status: SHIPPED | OUTPATIENT
Start: 2022-07-06

## 2022-07-06 RX ORDER — ERGOCALCIFEROL 1.25 MG/1
50000 CAPSULE ORAL WEEKLY
Qty: 12 CAPSULE | Refills: 1 | Status: SHIPPED | OUTPATIENT
Start: 2022-07-06

## 2022-07-06 RX ORDER — DULAGLUTIDE 3 MG/.5ML
3 INJECTION, SOLUTION SUBCUTANEOUS WEEKLY
Qty: 2 ML | Refills: 2 | Status: SHIPPED | OUTPATIENT
Start: 2022-07-06

## 2022-09-16 DIAGNOSIS — E11.9 TYPE 2 DIABETES MELLITUS WITHOUT COMPLICATION, WITHOUT LONG-TERM CURRENT USE OF INSULIN: ICD-10-CM

## 2022-09-16 RX ORDER — ASPIRIN 81 MG/1
TABLET, COATED ORAL
Qty: 90 TABLET | Refills: 3 | Status: SHIPPED | OUTPATIENT
Start: 2022-09-16

## 2022-09-18 DIAGNOSIS — E11.9 TYPE 2 DIABETES MELLITUS WITHOUT COMPLICATION, WITHOUT LONG-TERM CURRENT USE OF INSULIN: ICD-10-CM

## 2022-09-19 NOTE — TELEPHONE ENCOUNTER
Rx Refill Note  Requested Prescriptions     Pending Prescriptions Disp Refills   • metFORMIN (GLUCOPHAGE) 1000 MG tablet [Pharmacy Med Name: metFORMIN HCL 1,000 MG TABLET] 180 tablet 3     Sig: TAKE ONE TABLET BY MOUTH TWICE A DAY WITH MEALS   • Diclofenac Sodium (VOLTAREN) 1 % gel gel [Pharmacy Med Name: DICLOFENAC SODIUM 1% GEL] 100 g 0     Sig: APPLY TWO GRAMS TOPICALLY TO AFFECTED AREA(S) FOUR TIMES A DAY AS DIRECTED      Last office visit with prescribing clinician: 11/30/2021      Next office visit with prescribing clinician: Visit date not found            Fannie Marvin MA  09/19/22, 07:42 EDT

## 2022-10-14 DIAGNOSIS — M19.90 ARTHRITIS: ICD-10-CM

## 2022-10-14 RX ORDER — IBUPROFEN 800 MG/1
TABLET ORAL
Qty: 90 TABLET | Refills: 3 | Status: SHIPPED | OUTPATIENT
Start: 2022-10-14

## 2022-12-13 DIAGNOSIS — I10 ESSENTIAL HYPERTENSION: ICD-10-CM

## 2022-12-13 RX ORDER — BENAZEPRIL HYDROCHLORIDE 40 MG/1
TABLET, FILM COATED ORAL
Qty: 90 TABLET | Refills: 3 | Status: SHIPPED | OUTPATIENT
Start: 2022-12-13

## 2022-12-13 NOTE — TELEPHONE ENCOUNTER
Rx Refill Note  Requested Prescriptions     Pending Prescriptions Disp Refills   • benazepril (LOTENSIN) 40 MG tablet [Pharmacy Med Name: BENAZEPRIL HCL 40 MG TABLET] 90 tablet 3     Sig: TAKE ONE TABLET BY MOUTH DAILY      Last office visit with prescribing clinician: 11/30/2021   Last telemedicine visit with prescribing clinician: 12/27/2022   Next office visit with prescribing clinician: Visit date not found                         Would you like a call back once the refill request has been completed: [] Yes [] No    If the office needs to give you a call back, can they leave a voicemail: [] Yes [] No    Fannie Marvin MA  12/13/22, 07:10 EST

## 2022-12-14 DIAGNOSIS — E11.9 TYPE 2 DIABETES MELLITUS WITHOUT COMPLICATION, WITHOUT LONG-TERM CURRENT USE OF INSULIN: ICD-10-CM

## 2022-12-14 RX ORDER — GLIMEPIRIDE 4 MG/1
TABLET ORAL
Qty: 90 TABLET | Refills: 3 | Status: SHIPPED | OUTPATIENT
Start: 2022-12-14

## 2022-12-27 ENCOUNTER — OFFICE VISIT (OUTPATIENT)
Dept: FAMILY MEDICINE CLINIC | Facility: CLINIC | Age: 62
End: 2022-12-27

## 2022-12-27 VITALS
TEMPERATURE: 98 F | SYSTOLIC BLOOD PRESSURE: 128 MMHG | HEART RATE: 91 BPM | HEIGHT: 62 IN | OXYGEN SATURATION: 97 % | RESPIRATION RATE: 18 BRPM | BODY MASS INDEX: 36.91 KG/M2 | WEIGHT: 200.6 LBS | DIASTOLIC BLOOD PRESSURE: 70 MMHG

## 2022-12-27 DIAGNOSIS — E11.9 TYPE 2 DIABETES MELLITUS WITHOUT COMPLICATION, WITHOUT LONG-TERM CURRENT USE OF INSULIN: Primary | ICD-10-CM

## 2022-12-27 DIAGNOSIS — E83.52 HYPERCALCEMIA: ICD-10-CM

## 2022-12-27 DIAGNOSIS — E55.9 VITAMIN D DEFICIENCY: ICD-10-CM

## 2022-12-27 DIAGNOSIS — E78.2 MIXED HYPERLIPIDEMIA: ICD-10-CM

## 2022-12-27 PROCEDURE — 99212 OFFICE O/P EST SF 10 MIN: CPT | Performed by: NURSE PRACTITIONER

## 2022-12-27 NOTE — PROGRESS NOTES
"Chief Complaint  Diabetes    Subjective      History obtained from patient and daughter.   Queenie Lubin presents to [facility]  History of Present Illness  Diabetes Mellitus Type II, Follow-up: Patient here for follow-up of Type 2 diabetes mellitus. Symptoms: none. Home monitoring: The patient is checking blood sugars at home. Medications: The patient is adherent with their medication regimen. Medication(s): metformin, glimepiride and Trulicity. Due for: A1C.    Objective   Vital Signs:  /70 (BP Location: Right arm, Patient Position: Sitting, Cuff Size: Large Adult)   Pulse 91   Temp 98 °F (36.7 °C) (Temporal)   Resp 18   Ht 157.5 cm (62.01\")   Wt 91 kg (200 lb 9.6 oz)   SpO2 97%   BMI 36.68 kg/m²   Estimated body mass index is 36.68 kg/m² as calculated from the following:    Height as of this encounter: 157.5 cm (62.01\").    Weight as of this encounter: 91 kg (200 lb 9.6 oz).        Physical Exam  Vitals and nursing note reviewed.   Constitutional:       Appearance: Normal appearance.   Cardiovascular:      Rate and Rhythm: Normal rate and regular rhythm.      Heart sounds: Normal heart sounds.   Pulmonary:      Effort: Pulmonary effort is normal.      Breath sounds: Normal breath sounds.   Neurological:      Mental Status: She is alert and oriented to person, place, and time.   Psychiatric:         Mood and Affect: Mood normal.        Result Review :  The following data was reviewed by: OMAR Lloyd on 12/27/2022:  CMP    CMP 6/28/22   Glucose 278 (A)   BUN 9   Creatinine 0.61   Sodium 137   Potassium 5.1   Chloride 98   Calcium 10.9 (A)   Total Protein 7.7   Albumin 4.6   Globulin 3.1   Total Bilirubin 0.4   Alkaline Phosphatase 102   AST (SGOT) 21   ALT (SGPT) 24   BUN/Creatinine Ratio 15   (A) Abnormal value            Lipid Panel    Lipid Panel 6/28/22   Total Cholesterol 268 (A)   Triglycerides 285 (A)   HDL Cholesterol 49   VLDL Cholesterol 54 (A) "   LDL Cholesterol  165 (A)   (A) Abnormal value            TSH    TSH 6/28/22   TSH 1.040           Most Recent A1C    HGBA1C Most Recent 6/28/22   Hemoglobin A1C 12.5 (A)   (A) Abnormal value       Comments are available for some flowsheets but are not being displayed.           Microalbumin    Microalbumin 6/28/22   Microalbumin, Urine 28.9                  Assessment and Plan   Diagnoses and all orders for this visit:    1. Type 2 diabetes mellitus without complication, without long-term current use of insulin (HCC) (Primary)  -     Hemoglobin A1c  -     Comprehensive Metabolic Panel    2. Mixed hyperlipidemia  -     Lipid Panel With / Chol / HDL Ratio  -     Comprehensive Metabolic Panel    3. Vitamin D deficiency  -     Vitamin D,25-Hydroxy    4. Hypercalcemia  -     Protein Elec + Interp, Serum           I spent 15 minutes caring for Queenie on this date of service. This time includes time spent by me in the following activities:reviewing tests, performing a medically appropriate examination and/or evaluation , counseling and educating the patient/family/caregiver, ordering medications, tests, or procedures and documenting information in the medical record  Follow Up   Follow-up pending lab results.   Patient was given instructions and counseling regarding her condition or for health maintenance advice. Please see specific information pulled into the AVS if appropriate.

## 2022-12-28 LAB
25(OH)D3+25(OH)D2 SERPL-MCNC: 18.1 NG/ML (ref 30–100)
ALBUMIN SERPL ELPH-MCNC: 3.8 G/DL (ref 2.9–4.4)
ALBUMIN SERPL-MCNC: 4.3 G/DL (ref 3.5–5.2)
ALBUMIN/GLOB SERPL: 1.1 {RATIO} (ref 0.7–1.7)
ALBUMIN/GLOB SERPL: 1.5 G/DL
ALP SERPL-CCNC: 98 U/L (ref 39–117)
ALPHA1 GLOB SERPL ELPH-MCNC: 0.2 G/DL (ref 0–0.4)
ALPHA2 GLOB SERPL ELPH-MCNC: 0.7 G/DL (ref 0.4–1)
ALT SERPL-CCNC: 14 U/L (ref 1–33)
AST SERPL-CCNC: 17 U/L (ref 1–32)
B-GLOBULIN SERPL ELPH-MCNC: 1.5 G/DL (ref 0.7–1.3)
BILIRUB SERPL-MCNC: 0.2 MG/DL (ref 0–1.2)
BUN SERPL-MCNC: 11 MG/DL (ref 8–23)
BUN/CREAT SERPL: 17.7 (ref 7–25)
CALCIUM SERPL-MCNC: 10.4 MG/DL (ref 8.6–10.5)
CHLORIDE SERPL-SCNC: 101 MMOL/L (ref 98–107)
CHOLEST SERPL-MCNC: 256 MG/DL (ref 0–200)
CHOLEST/HDLC SERPL: 5.95 {RATIO}
CO2 SERPL-SCNC: 27.7 MMOL/L (ref 22–29)
CREAT SERPL-MCNC: 0.62 MG/DL (ref 0.57–1)
EGFRCR SERPLBLD CKD-EPI 2021: 100.8 ML/MIN/1.73
GAMMA GLOB SERPL ELPH-MCNC: 1 G/DL (ref 0.4–1.8)
GLOBULIN SER CALC-MCNC: 2.9 GM/DL
GLOBULIN SER CALC-MCNC: 3.4 G/DL (ref 2.2–3.9)
GLUCOSE SERPL-MCNC: 230 MG/DL (ref 65–99)
HBA1C MFR BLD: 10.5 % (ref 4.8–5.6)
HDLC SERPL-MCNC: 43 MG/DL (ref 40–60)
LABORATORY COMMENT REPORT: ABNORMAL
LDLC SERPL CALC-MCNC: 163 MG/DL (ref 0–100)
M PROTEIN SERPL ELPH-MCNC: ABNORMAL G/DL
POTASSIUM SERPL-SCNC: 5.2 MMOL/L (ref 3.5–5.2)
PROT PATTERN SERPL ELPH-IMP: ABNORMAL
PROT SERPL-MCNC: 7.2 G/DL (ref 6–8.5)
SODIUM SERPL-SCNC: 139 MMOL/L (ref 136–145)
TRIGL SERPL-MCNC: 269 MG/DL (ref 0–150)
VLDLC SERPL CALC-MCNC: 50 MG/DL (ref 5–40)

## 2023-01-03 RX ORDER — PIOGLITAZONEHYDROCHLORIDE 15 MG/1
15 TABLET ORAL DAILY
Qty: 90 TABLET | Refills: 3 | Status: SHIPPED | OUTPATIENT
Start: 2023-01-03

## 2023-01-03 RX ORDER — EZETIMIBE 10 MG/1
10 TABLET ORAL DAILY
Qty: 90 TABLET | Refills: 3 | Status: SHIPPED | OUTPATIENT
Start: 2023-01-03